# Patient Record
Sex: FEMALE | Race: WHITE | Employment: OTHER | URBAN - METROPOLITAN AREA
[De-identification: names, ages, dates, MRNs, and addresses within clinical notes are randomized per-mention and may not be internally consistent; named-entity substitution may affect disease eponyms.]

---

## 2017-01-30 RX ORDER — ACYCLOVIR 200 MG/1
CAPSULE ORAL
Qty: 50 CAPSULE | Refills: 0 | Status: SHIPPED | OUTPATIENT
Start: 2017-01-30 | End: 2017-08-18 | Stop reason: SDUPTHER

## 2017-02-14 RX ORDER — FLUOXETINE 20 MG/1
TABLET, FILM COATED ORAL
Qty: 90 TABLET | Refills: 3 | Status: SHIPPED | OUTPATIENT
Start: 2017-02-14 | End: 2018-02-09 | Stop reason: SDUPTHER

## 2017-08-18 RX ORDER — ACYCLOVIR 200 MG/1
CAPSULE ORAL
Qty: 50 CAPSULE | Refills: 2 | Status: SHIPPED | OUTPATIENT
Start: 2017-08-18

## 2018-02-09 RX ORDER — FLUOXETINE 20 MG/1
TABLET, FILM COATED ORAL
Qty: 90 TABLET | Refills: 3 | Status: SHIPPED | OUTPATIENT
Start: 2018-02-09 | End: 2022-01-27 | Stop reason: ALTCHOICE

## 2018-02-09 NOTE — TELEPHONE ENCOUNTER
Next Visit Date:  No future appointments.     Health Maintenance   Topic Date Due    Hepatitis C screen  1955    HIV screen  10/29/1968    Pneumococcal med risk (1 of 1 - PPSV23) 10/29/1972    Cervical cancer screen  10/29/1974    Lipid screen  10/29/1993    Colon cancer screen colonoscopy  10/29/2003    Zostavax vaccine  10/29/2013    Flu vaccine (1) 09/01/2017    Breast cancer screen  07/27/2018    DTaP/Tdap/Td vaccine (2 - Td) 08/29/2023       No results found for: LABA1C          ( goal A1C is < 7)   No results found for: LABMICR  No results found for: LDLCHOLESTEROL, LDLCALC    (goal LDL is <100)   No results found for: AST, ALT, BUN  BP Readings from Last 3 Encounters:   11/25/16 (!) 148/93   05/05/15 124/86   03/23/15 136/88          (goal 120/80)    All Future Testing planned in [facility]              Patient Active Problem List:     Anxiety     Depression     COPD (chronic obstructive pulmonary disease) ([facility] [address]

## 2020-02-20 ENCOUNTER — OFFICE VISIT (OUTPATIENT)
Dept: NEUROLOGY | Age: 65
End: 2020-02-20
Payer: MEDICARE

## 2020-02-20 ENCOUNTER — HOSPITAL ENCOUNTER (OUTPATIENT)
Age: 65
Discharge: HOME | End: 2020-02-20
Payer: MEDICARE

## 2020-02-20 VITALS
HEART RATE: 108 BPM | DIASTOLIC BLOOD PRESSURE: 89 MMHG | BODY MASS INDEX: 17.7 KG/M2 | WEIGHT: 113 LBS | SYSTOLIC BLOOD PRESSURE: 137 MMHG

## 2020-02-20 LAB
CERULOPLASMIN: 36 MG/DL (ref 16–45)
ESTIMATED AVERAGE GLUCOSE: 94 MG/DL
FOLATE: 4.6 NG/ML
HBA1C MFR BLD: 4.9 % (ref 4–6)
TSH SERPL DL<=0.05 MIU/L-ACNC: 4.08 MIU/L (ref 0.3–5)
VITAMIN B-12: 998 PG/ML (ref 232–1245)

## 2020-02-20 PROCEDURE — 82525 ASSAY OF COPPER: CPT

## 2020-02-20 PROCEDURE — 82390 ASSAY OF CERULOPLASMIN: CPT

## 2020-02-20 PROCEDURE — 84165 PROTEIN E-PHORESIS SERUM: CPT

## 2020-02-20 PROCEDURE — 3017F COLORECTAL CA SCREEN DOC REV: CPT | Performed by: STUDENT IN AN ORGANIZED HEALTH CARE EDUCATION/TRAINING PROGRAM

## 2020-02-20 PROCEDURE — 4040F PNEUMOC VAC/ADMIN/RCVD: CPT | Performed by: STUDENT IN AN ORGANIZED HEALTH CARE EDUCATION/TRAINING PROGRAM

## 2020-02-20 PROCEDURE — 84443 ASSAY THYROID STIM HORMONE: CPT

## 2020-02-20 PROCEDURE — 83036 HEMOGLOBIN GLYCOSYLATED A1C: CPT

## 2020-02-20 PROCEDURE — 84155 ASSAY OF PROTEIN SERUM: CPT

## 2020-02-20 PROCEDURE — 82607 VITAMIN B-12: CPT

## 2020-02-20 PROCEDURE — 4004F PT TOBACCO SCREEN RCVD TLK: CPT | Performed by: STUDENT IN AN ORGANIZED HEALTH CARE EDUCATION/TRAINING PROGRAM

## 2020-02-20 PROCEDURE — 82746 ASSAY OF FOLIC ACID SERUM: CPT

## 2020-02-20 PROCEDURE — G8427 DOCREV CUR MEDS BY ELIG CLIN: HCPCS | Performed by: STUDENT IN AN ORGANIZED HEALTH CARE EDUCATION/TRAINING PROGRAM

## 2020-02-20 PROCEDURE — 84156 ASSAY OF PROTEIN URINE: CPT

## 2020-02-20 PROCEDURE — 1123F ACP DISCUSS/DSCN MKR DOCD: CPT | Performed by: STUDENT IN AN ORGANIZED HEALTH CARE EDUCATION/TRAINING PROGRAM

## 2020-02-20 PROCEDURE — 1090F PRES/ABSN URINE INCON ASSESS: CPT | Performed by: STUDENT IN AN ORGANIZED HEALTH CARE EDUCATION/TRAINING PROGRAM

## 2020-02-20 PROCEDURE — 99205 OFFICE O/P NEW HI 60 MIN: CPT | Performed by: STUDENT IN AN ORGANIZED HEALTH CARE EDUCATION/TRAINING PROGRAM

## 2020-02-20 PROCEDURE — 84166 PROTEIN E-PHORESIS/URINE/CSF: CPT

## 2020-02-20 PROCEDURE — G8400 PT W/DXA NO RESULTS DOC: HCPCS | Performed by: STUDENT IN AN ORGANIZED HEALTH CARE EDUCATION/TRAINING PROGRAM

## 2020-02-20 PROCEDURE — G8419 CALC BMI OUT NRM PARAM NOF/U: HCPCS | Performed by: STUDENT IN AN ORGANIZED HEALTH CARE EDUCATION/TRAINING PROGRAM

## 2020-02-20 PROCEDURE — 36415 COLL VENOUS BLD VENIPUNCTURE: CPT

## 2020-02-20 PROCEDURE — G8484 FLU IMMUNIZE NO ADMIN: HCPCS | Performed by: STUDENT IN AN ORGANIZED HEALTH CARE EDUCATION/TRAINING PROGRAM

## 2020-02-20 RX ORDER — GABAPENTIN 100 MG/1
CAPSULE ORAL
Qty: 45 CAPSULE | Refills: 0 | Status: SHIPPED | OUTPATIENT
Start: 2020-02-20 | End: 2020-02-21 | Stop reason: SDUPTHER

## 2020-02-20 NOTE — PROGRESS NOTES
[address] [place] [address], [facility], [facility] [place] [facility], [facility], [address] [place] [address]  P: [phone]  F: [phone]    NEUROLOGY CLINIC NOTE     PATIENT NAME: Negra Miguel  PATIENT MRN: [id number]  PRIMARY CARE PHYSICIAN: No primary care provider on file. HPI:      Negra Miguel is a 75 y.o. left handed  female with PMH significant for anxiety and depression was seen in the clinic for neuropathy    History obtained from Patient    Around 4 years ago patient fell down and hurt her ankle, she was found to have left malleolar fracture, initially was treated with the cast but was later given boot for that. But after 1 month she started having tingling and numbness in the lateral aspect of the left foot, gradually getting worse then after couple of months she fell down and got hurt on the right leg. After couple of months started having tingling and numbness on the right feet too. Initially she was having intermittent paresthesias in her feet, gradually getting worse, now she is having constant paresthesias in her feet along with burning pain at the top of the feet, sometimes endorses weakness in the legs especially when walking for a long period of time, denies any difficulty with walking. Denies any back pain or radicular symptoms. Denies any neck pain or radicular symptoms. Denies any symptoms in bilateral upper extremities. Patient also complains of bilateral lower extremity numbness especially on waking up in the morning, she feels as if her legs asleep, it improves after 10 minutes. Denies any saddle anesthesia or perineal numbness or problem with bowel bladder function. Denies taking any medications for the paresthesias. PATIENT HISTORY:     Past Medical History:   Diagnosis Date    Anxiety     Depression         History reviewed. No pertinent surgical history.      Social History     Socioeconomic History    Marital status: Single     Spouse name: Not on file    Number of children: Not on file    Years of education: Not on file    Highest education level: Not on file   Occupational History    Not on file   Social Needs    Financial resource strain: Not on file    Food insecurity:     Worry: Not on file     Inability: Not on file    Transportation needs:     Medical: Not on file     Non-medical: Not on file   Tobacco Use    Smoking status: Current Every Day Smoker     Packs/day: 1.50     Years: 30.00     Pack years: 45.00     Types: Cigarettes     Last attempt to quit: 2014     Years since quittin.1    Smokeless tobacco: Never Used   Substance and Sexual Activity    Alcohol use: No    Drug use: No    Sexual activity: Never   Lifestyle    Physical activity:     Days per week: Not on file     Minutes per session: Not on file    Stress: Not on file   Relationships    Social connections:     Talks on phone: Not on file     Gets together: Not on file     Attends Restoration service: Not on file     Active member of club or organization: Not on file     Attends meetings of clubs or organizations: Not on file     Relationship status: Not on file    Intimate partner violence:     Fear of current or ex partner: Not on file     Emotionally abused: Not on file     Physically abused: Not on file     Forced sexual activity: Not on file   Other Topics Concern    Not on file   Social History Narrative    Not on file        Current Outpatient Medications   Medication Sig Dispense Refill    gabapentin (NEURONTIN) 100 MG capsule Gabapentin 100 mg cap  Week 1 take 100 mg 1 cap at bedtime  Week 2 take 200 mg 2 cap at bedtime and   Week 3 take 300 mg 3 cap at bedtime.  45 capsule 0    FLUoxetine (PROZAC) 20 MG tablet TAKE 1 TABLET DAILY (Patient not taking: Reported on 2020) 90 tablet 3    acyclovir (ZOVIRAX) 200 MG capsule TAKE 2 CAPSULES BY MOUTH FIVE TIMES DAILY (Patient not taking: Reported on 2020) 50 capsule 2    HYDROcodone-acetaminophen (Loraine Lark) 5-325 MG per tablet Take 1 tablet by mouth every 6 hours as needed for Pain (Patient not taking: Reported on 2/20/2020) 20 tablet 0    ibuprofen (ADVIL;MOTRIN) 800 MG tablet Take 1 tablet by mouth every 8 hours as needed for Pain (Patient not taking: Reported on 2/20/2020) 20 tablet 0     No current facility-administered medications for this visit. No Known Allergies     REVIEW OF SYSTEMS:     Review of Systems   Constitutional: Negative for chills, diaphoresis, fever and unexpected weight change. HENT: Negative for congestion, ear discharge, ear pain, hearing loss, sinus pain and sore throat. Eyes: Negative for photophobia, pain, discharge, redness and visual disturbance. Respiratory: Negative for cough and shortness of breath. Cardiovascular: Negative for chest pain, palpitations and leg swelling. Gastrointestinal: Negative for abdominal pain, constipation, diarrhea, nausea and vomiting. Endocrine: Negative for polydipsia and polyuria. Genitourinary: Negative for difficulty urinating and hematuria. Musculoskeletal: Negative for neck pain. Skin: Negative for pallor and rash. Neurological: Positive for weakness and numbness. Negative for dizziness, tremors, seizures, syncope, facial asymmetry, speech difficulty, light-headedness and headaches. Hematological: Does not bruise/bleed easily. Psychiatric/Behavioral: Negative for agitation, behavioral problems and hallucinations. VITALS  /89 (Site: Right Upper Arm, Position: Sitting, Cuff Size: Child)   Pulse 108   Wt 113 lb (51.3 kg)   BMI 17.70 kg/m²      PHYSICAL EXAMINATION:     Constitutional: Well developed, well nourished and in no acute distress. Head:  normocephalic, atraumatic. Neck: supple, no carotid bruits, thyroid not palpable  Respiratory: Clear to auscultation bilaterally with no use of accessory muscles during respiration. Cardiovascular: normal rate, regular rhythm, no murmur, gallop, rub.   Abdomen: Soft, nontender, nondistended, normal bowel sounds, no hepatomegaly or splenomegaly  Extremities:  peripheral pulses palpable, no pedal edema or calf pain with palpation  Psych: normal affect      NEUROLOGICAL EXAMINATION:     Mental status   Alert and oriented; intact memory with no confusion, speech or language problems; no hallucinations or delusions     Cranial nerves   II - visual fields intact to confrontation                                                III, IV, VI - extra-ocular muscles full: no pupillary defect; no ROSINA, no nystagmus, no ptosis   V - normal facial sensation                                                               VII - normal facial symmetry                                                             VIII - intact hearing                                                                             IX, X - symmetrical palate                                                                  XI - symmetrical shoulder shrug                                                       XII - midline tongue without atrophy or fasciculation     Motor function  Normal muscle bulk and tone  Muscle strength: normal power 5/5 except for LLE 4+/5 and RLE 5-/5       Sensory function Intact to touch, pin prick, vibration, proprioception in bilateral upper  extremities. Decreased light touch and pin prick in bilateral lower extremities from distal to proximal distribution. Cerebellar  impaired fine hand movements. Finger-to-nose intact bilaterally  Intermittent postural tremors noted on exam bilaterally, worse on the left side as compared to the right side, slightly improves with some distraction. Reflex function  brisk reflexes bilaterally. Negative Babinski  hoffamans positive on left side. 2-3 beats of nonsustained clonus noted on both sides     Gait                  Normal station and gait  Difficulty with performing toe and heel walking.             PRIOR TESTS AND IMAGING: Following images and Labs were reviewed by the examiner     none      ASSESSMENT / PLAN:     Keely Chavarria is a 75 y.o. left handed  female with PMH significant for anxiety and depression was seen in the clinic for neuropathy    Possible peripheral polyneuropathy. Pain and paresthesias in bilateral feet. History of anxiety and depression   COPD      PLAN:   -Would like to evaluate with reversible causes of neuropathy including HbA1c, vitamin B12, folate, TSH, serum copper and serum plasma level, SPEP, UPEP  -EMG nerve conduction studies of bilateral lower extremities  -Will give a trial of Gabapentin 300 mg at bedtime. Week 1 100 mg QHS  Week 2 200 mg QHS  Week 3 300 mg QHS. Discussed possible side effects of sedation, drowsiness and dizziness, ataxia with the patient. Instructed patient to call the clinic if develop any of the possible side effects.  - Follow up in the clinic after above testing  - Instructed patient to call the clinic if symptoms worsen or develop any new symptoms. I have spent 60 minutes face to face with the patient more than 50% of this time was spent counseling and coordinating care.       Electronically signed by Keisha Kapadia MD on 2/20/2020 at 4:48 PM

## 2020-02-21 LAB
ALBUMIN (CALCULATED): 2.9 G/DL (ref 3.2–5.2)
ALBUMIN PERCENT: 47 % (ref 45–65)
ALPHA 1 PERCENT: 6 % (ref 3–6)
ALPHA 2 PERCENT: 14 % (ref 6–13)
ALPHA-1-GLOBULIN: 0.4 G/DL (ref 0.1–0.4)
ALPHA-2-GLOBULIN: 0.9 G/DL (ref 0.5–0.9)
BETA GLOBULIN: 1 G/DL (ref 0.5–1.1)
BETA PERCENT: 16 % (ref 11–19)
GAMMA GLOBULIN %: 17 % (ref 9–20)
GAMMA GLOBULIN: 1 G/DL (ref 0.5–1.5)
PATHOLOGIST: ABNORMAL
PROTEIN ELECTROPHORESIS, SERUM: ABNORMAL
TOTAL PROT. SUM,%: 100 % (ref 98–102)
TOTAL PROT. SUM: 6.2 G/DL (ref 6.3–8.2)
TOTAL PROTEIN: 6.2 G/DL (ref 6.4–8.3)

## 2020-02-21 RX ORDER — GABAPENTIN 100 MG/1
CAPSULE ORAL
Qty: 45 CAPSULE | Refills: 0 | Status: SHIPPED | OUTPATIENT
Start: 2020-02-21 | End: 2020-03-25 | Stop reason: SDUPTHER

## 2020-02-21 ASSESSMENT — ENCOUNTER SYMPTOMS
SINUS PAIN: 0
SORE THROAT: 0
DIARRHEA: 0
SHORTNESS OF BREATH: 0
CONSTIPATION: 0
COUGH: 0
EYE DISCHARGE: 0
PHOTOPHOBIA: 0
ABDOMINAL PAIN: 0
NAUSEA: 0
EYE PAIN: 0
EYE REDNESS: 0
VOMITING: 0

## 2020-02-22 LAB — COPPER: 166.2 UG/DL (ref 80–155)

## 2020-02-24 LAB
P E INTERPRETATION, U: NORMAL
PATHOLOGIST: NORMAL
SPECIMEN TYPE: NORMAL
URINE TOTAL PROTEIN: 62 MG/DL

## 2020-03-25 RX ORDER — GABAPENTIN 100 MG/1
300 CAPSULE ORAL NIGHTLY
Qty: 270 CAPSULE | Refills: 1 | Status: SHIPPED | OUTPATIENT
Start: 2020-03-25 | End: 2021-02-09

## 2020-03-25 RX ORDER — GABAPENTIN 100 MG/1
CAPSULE ORAL
Qty: 45 CAPSULE | Refills: 0 | OUTPATIENT
Start: 2020-03-25 | End: 2021-03-26

## 2020-06-04 ENCOUNTER — OFFICE VISIT (OUTPATIENT)
Dept: NEUROLOGY | Age: 65
End: 2020-06-04
Payer: MEDICARE

## 2020-06-04 VITALS
OXYGEN SATURATION: 98 % | HEIGHT: 67 IN | HEART RATE: 83 BPM | SYSTOLIC BLOOD PRESSURE: 128 MMHG | DIASTOLIC BLOOD PRESSURE: 87 MMHG | BODY MASS INDEX: 18.05 KG/M2 | WEIGHT: 115 LBS

## 2020-06-04 PROCEDURE — 99214 OFFICE O/P EST MOD 30 MIN: CPT | Performed by: STUDENT IN AN ORGANIZED HEALTH CARE EDUCATION/TRAINING PROGRAM

## 2020-06-04 ASSESSMENT — ENCOUNTER SYMPTOMS
ABDOMINAL PAIN: 0
EYE PAIN: 0
NAUSEA: 0
CONSTIPATION: 0
COUGH: 0
EYE REDNESS: 0
EYE DISCHARGE: 0
SORE THROAT: 0
SINUS PAIN: 0
SHORTNESS OF BREATH: 0
DIARRHEA: 0
PHOTOPHOBIA: 0
VOMITING: 0

## 2020-06-04 NOTE — PROGRESS NOTES
[address] [place] [address], [facility] [place] [address], [facility], [facility], [address] [place] [address]  P: [phone]  F: [phone]    NEUROLOGY CLINIC NOTE     PATIENT NAME: Quan Brown  PATIENT MRN: [id number]  PRIMARY CARE PHYSICIAN: No primary care provider on file. Interval history 6/4/2020  Patient was last seen in the clinic in February 2020, during the last visit she was started on gabapentin for neuropathy, she was prescribed 300 mg at nighttime with gradual dose titration. Currently she is taking only 200 mg in the morning, as per patient she did not feel that she needed 300 mg. Endorses improvement in her paresthesias with gabapentin though still having intermittent episodes of pain in paresthesias in her feet. EMG nerve conduction study was ordered during last visit, she does not want to do the procedure as it is painful. Lab work-up from last visit showed slightly elevated copper level, denies taking any copper supplements. Ceruloplasmin level was normal.  Discussed SPEP and UPEP results with the patient. Patient denies any other concerns during this visit. Notes from 2/20/2020  HPI:      Quan Brown is a 75 y.o. left handed  female with PMH significant for anxiety and depression was seen in the clinic for neuropathy    History obtained from Patient    Around 4 years ago patient fell down and hurt her ankle, she was found to have left malleolar fracture, initially was treated with the cast but was later given boot for that. But after 1 month she started having tingling and numbness in the lateral aspect of the left foot, gradually getting worse then after couple of months she fell down and got hurt on the right leg. After couple of months started having tingling and numbness on the right feet too.    Initially she was having intermittent paresthesias in her feet, gradually getting worse, now she is having constant paresthesias in her feet along with burning pain at the top of the feet, sometimes endorses weakness in the legs especially when walking for a long period of time, denies any difficulty with walking. Denies any back pain or radicular symptoms. Denies any neck pain or radicular symptoms. Denies any symptoms in bilateral upper extremities. Patient also complains of bilateral lower extremity numbness especially on waking up in the morning, she feels as if her legs asleep, it improves after 10 minutes. Denies any saddle anesthesia or perineal numbness or problem with bowel bladder function. Denies taking any medications for the paresthesias. PATIENT HISTORY:     Past Medical History:   Diagnosis Date    Anxiety     Depression         No past surgical history on file.      Social History     Socioeconomic History    Marital status: Single     Spouse name: Not on file    Number of children: Not on file    Years of education: Not on file    Highest education level: Not on file   Occupational History    Not on file   Social Needs    Financial resource strain: Not on file    Food insecurity     Worry: Not on file     Inability: Not on file    Transportation needs     Medical: Not on file     Non-medical: Not on file   Tobacco Use    Smoking status: Current Every Day Smoker     Packs/day: 1.50     Years: 30.00     Pack years: 45.00     Types: Cigarettes     Last attempt to quit: 2014     Years since quittin.4    Smokeless tobacco: Never Used   Substance and Sexual Activity    Alcohol use: No    Drug use: No    Sexual activity: Never   Lifestyle    Physical activity     Days per week: Not on file     Minutes per session: Not on file    Stress: Not on file   Relationships    Social connections     Talks on phone: Not on file     Gets together: Not on file     Attends Orthodoxy service: Not on file     Active member of club or organization: Not on file     Attends meetings of clubs or organizations: Not on file     Relationship status: Not on file   Hans Salcido Intimate partner violence     Fear of current or ex partner: Not on file     Emotionally abused: Not on file     Physically abused: Not on file     Forced sexual activity: Not on file   Other Topics Concern    Not on file   Social History Narrative    Not on file        Current Outpatient Medications   Medication Sig Dispense Refill    gabapentin (NEURONTIN) 100 MG capsule Take 3 capsules by mouth nightly for 90 days. Gabapentin 100 mg cap  Week 1 take 100 mg 1 cap at bedtime  Week 2 take 200 mg 2 cap at bedtime and   Week 3 take 300 mg 3 cap at bedtime. 270 capsule 1    FLUoxetine (PROZAC) 20 MG tablet TAKE 1 TABLET DAILY (Patient not taking: Reported on 2/20/2020) 90 tablet 3    acyclovir (ZOVIRAX) 200 MG capsule TAKE 2 CAPSULES BY MOUTH FIVE TIMES DAILY (Patient not taking: Reported on 2/20/2020) 50 capsule 2    HYDROcodone-acetaminophen (NORCO) 5-325 MG per tablet Take 1 tablet by mouth every 6 hours as needed for Pain (Patient not taking: Reported on 2/20/2020) 20 tablet 0    ibuprofen (ADVIL;MOTRIN) 800 MG tablet Take 1 tablet by mouth every 8 hours as needed for Pain (Patient not taking: Reported on 2/20/2020) 20 tablet 0     No current facility-administered medications for this visit. No Known Allergies     REVIEW OF SYSTEMS:     Review of Systems   Constitutional: Negative for chills, diaphoresis, fever and unexpected weight change. HENT: Negative for congestion, ear discharge, ear pain, hearing loss, sinus pain and sore throat. Eyes: Negative for photophobia, pain, discharge, redness and visual disturbance. Respiratory: Negative for cough and shortness of breath. Cardiovascular: Negative for chest pain, palpitations and leg swelling. Gastrointestinal: Negative for abdominal pain, constipation, diarrhea, nausea and vomiting. Endocrine: Negative for polydipsia and polyuria. Genitourinary: Negative for difficulty urinating and hematuria.    Musculoskeletal: Negative for neck pain.   Skin: Negative for pallor and rash. Neurological: Positive for weakness and numbness. Negative for dizziness, tremors, seizures, syncope, facial asymmetry, speech difficulty, light-headedness and headaches. Hematological: Does not bruise/bleed easily. Psychiatric/Behavioral: Negative for agitation, behavioral problems and hallucinations. VITALS  /87 (Site: Left Upper Arm, Position: Sitting, Cuff Size: Medium Adult)   Pulse 83   Ht 5' 7\" (1.702 m)   Wt 115 lb (52.2 kg)   SpO2 98%   BMI 18.01 kg/m²      PHYSICAL EXAMINATION:     Constitutional: Well developed, well nourished and in no acute distress. Head:  normocephalic, atraumatic. Neck: supple, no carotid bruits, thyroid not palpable  Respiratory: Clear to auscultation bilaterally with no use of accessory muscles during respiration. Cardiovascular: normal rate, regular rhythm, no murmur, gallop, rub.   Abdomen: Soft, nontender, nondistended, normal bowel sounds, no hepatomegaly or splenomegaly  Extremities:  peripheral pulses palpable, no pedal edema or calf pain with palpation  Psych: normal affect      NEUROLOGICAL EXAMINATION:     Mental status   Alert and oriented; intact memory with no confusion, speech or language problems; no hallucinations or delusions     Cranial nerves   II - visual fields intact to confrontation                                                III, IV, VI - extra-ocular muscles full: no pupillary defect; no ROSINA, no nystagmus, no ptosis   V - normal facial sensation                                                               VII - normal facial symmetry                                                             VIII - intact hearing                                                                             IX, X - symmetrical palate                                                                  XI - symmetrical shoulder shrug                                                       XII - midline tongue without atrophy or fasciculation     Motor function  Normal muscle bulk and tone  Muscle strength: normal power 5/5 except for LLE 4+/5 and RLE 5-/5       Sensory function Intact to touch, pin prick, vibration, proprioception in bilateral upper  extremities. Decreased light touch and pin prick in bilateral lower extremities from distal to proximal distribution. Cerebellar  impaired fine hand movements. Finger-to-nose intact bilaterally  Intermittent postural tremors noted on exam bilaterally, worse on the left side as compared to the right side, slightly improves with some distraction. Reflex function  brisk reflexes bilaterally. Negative Babinski  hoffamans positive on left side. 2-3 beats of nonsustained clonus noted on both sides     Gait                  Normal station and gait  Difficulty with performing toe and heel walking. PRIOR TESTS AND IMAGING: Following images and Labs were reviewed by the examiner     none    2/20/2020  Vitamin B12 998  Folate 4.6  TSH 4.08  SPEP no M band seen  UPEP-elevated protein concentration  HbA1c 4.9  Serum copper 166.2  Ceruloplasmin 36      ASSESSMENT / PLAN:     Gabrielle Samson is a 75 y.o. left handed  female with PMH significant for anxiety and depression was seen in the clinic for neuropathy    · Possible peripheral polyneuropathy. Pain and paresthesias in bilateral feet. · History of anxiety and depression   · COPD    2/20/2020  Vitamin B12 998  Folate 4.6  TSH 4.08  SPEP no M band seen  UPEP-elevated protein concentration  HbA1c 4.9  Serum copper 166.2  Ceruloplasmin 36    PLAN:     -EMG nerve conduction studies of bilateral lower extremities was ordered during last visit, patient does not want to do the testing due to pain. -Instructed patient to take gabapentin 300 mg daily, at present she is taking 200 mg daily.   She will call back in 2 weeks to see if 300 mg is helping her, or she wants to increase it to 300 mg twice a day.  Discussed possible side effects of sedation, drowsiness and dizziness, ataxia with the patient. Instructed patient to call the clinic if develop any of the possible side effects.    - Follow up in the clinic in 4 to 5 months  - Instructed patient to call the clinic if symptoms worsen or develop any new symptoms. I have spent 25 minutes face to face with the patient more than 50% of this time was spent counseling and coordinating care.       Electronically signed by Javier Lynn MD on 6/4/2020 at 11:35 AM

## 2020-06-17 ENCOUNTER — TELEPHONE (OUTPATIENT)
Dept: NEUROLOGY | Age: 65
End: 2020-06-17

## 2020-06-17 NOTE — TELEPHONE ENCOUNTER
Patient states that the Gabapentin 100 mg capsules take 3 capsules at night is not working. Patient states that she takes 2 capsules in the morning and 1 capsule at night because when she takes all 3 at night it makes her sweaty like she has jumped in a pool. She states that the medication is not working for her. She states that she would like your Advice on her taking 400 MG a day 2 capsules in the morning and 2 capsules at night she states that the neuropathy is tolerable in her feet and legs.

## 2020-10-05 ENCOUNTER — HOSPITAL ENCOUNTER (OUTPATIENT)
Age: 65
Discharge: HOME | End: 2020-10-05
Payer: MEDICARE

## 2020-10-05 PROCEDURE — 36415 COLL VENOUS BLD VENIPUNCTURE: CPT

## 2020-10-05 PROCEDURE — 82525 ASSAY OF COPPER: CPT

## 2020-10-06 ENCOUNTER — OFFICE VISIT (OUTPATIENT)
Dept: NEUROLOGY | Age: 65
End: 2020-10-06
Payer: MEDICARE

## 2020-10-06 VITALS
DIASTOLIC BLOOD PRESSURE: 72 MMHG | BODY MASS INDEX: 18.16 KG/M2 | WEIGHT: 113 LBS | TEMPERATURE: 96 F | OXYGEN SATURATION: 99 % | SYSTOLIC BLOOD PRESSURE: 112 MMHG | HEART RATE: 95 BPM | HEIGHT: 66 IN

## 2020-10-06 PROCEDURE — 99214 OFFICE O/P EST MOD 30 MIN: CPT | Performed by: STUDENT IN AN ORGANIZED HEALTH CARE EDUCATION/TRAINING PROGRAM

## 2020-10-06 RX ORDER — GABAPENTIN 100 MG/1
200 CAPSULE ORAL 2 TIMES DAILY
Qty: 120 CAPSULE | Refills: 3 | Status: SHIPPED | OUTPATIENT
Start: 2020-10-06 | End: 2021-02-09 | Stop reason: SDUPTHER

## 2020-10-06 RX ORDER — GABAPENTIN 100 MG/1
300 CAPSULE ORAL NIGHTLY
Qty: 270 CAPSULE | Refills: 1 | Status: CANCELLED | OUTPATIENT
Start: 2020-10-06 | End: 2021-01-04

## 2020-10-06 ASSESSMENT — ENCOUNTER SYMPTOMS
VOMITING: 0
EYE DISCHARGE: 0
SORE THROAT: 0
PHOTOPHOBIA: 0
ABDOMINAL PAIN: 0
COUGH: 0
SINUS PAIN: 0
EYE PAIN: 0
NAUSEA: 0
SHORTNESS OF BREATH: 0
DIARRHEA: 0
CONSTIPATION: 0
EYE REDNESS: 0

## 2020-10-06 NOTE — PROGRESS NOTES
[address] [place] [address], [facility], [facility], [facility], [address] [place] [address]  P: [phone]  F: [phone]    NEUROLOGY CLINIC NOTE     PATIENT NAME: Elvin Magaña  PATIENT MRN: [id number]  PRIMARY CARE PHYSICIAN: No primary care provider on file. Interval History: 10/6/2020  Patient was last seen in the clinic in June 2020. Since last visit overall she endorses significant improvement in her paresthesias in her hands and feet. As per patient if she missed her doses endorses worsening of the symptoms with that. Currently she is taking gabapentin 200 mg in the morning and 200 mg at nighttime. Denies any side effects of the medications. Patient endorses feeling sweaty with 300 mg gabapentin at nighttime. Otherwise denies any other new neurologic concerns, denies any changes in her other medications. Denies any other new allergies. She still does not want to do EMG nerve conduction study. Interval history 6/4/2020  Patient was last seen in the clinic in February 2020, during the last visit she was started on gabapentin for neuropathy, she was prescribed 300 mg at nighttime with gradual dose titration. Currently she is taking only 200 mg in the morning, as per patient she did not feel that she needed 300 mg. Endorses improvement in her paresthesias with gabapentin though still having intermittent episodes of pain in paresthesias in her feet. EMG nerve conduction study was ordered during last visit, she does not want to do the procedure as it is painful. Lab work-up from last visit showed slightly elevated copper level, denies taking any copper supplements. Ceruloplasmin level was normal.  Discussed SPEP and UPEP results with the patient. Patient denies any other concerns during this visit.     Notes from 2/20/2020  HPI:      Elvin Magaña is a 75 y.o. left handed  female with PMH significant for anxiety and depression was seen in the clinic for neuropathy    History obtained from Patient    Around 4 years ago patient fell down and hurt her ankle, she was found to have left malleolar fracture, initially was treated with the cast but was later given boot for that. But after 1 month she started having tingling and numbness in the lateral aspect of the left foot, gradually getting worse then after couple of months she fell down and got hurt on the right leg. After couple of months started having tingling and numbness on the right feet too. Initially she was having intermittent paresthesias in her feet, gradually getting worse, now she is having constant paresthesias in her feet along with burning pain at the top of the feet, sometimes endorses weakness in the legs especially when walking for a long period of time, denies any difficulty with walking. Denies any back pain or radicular symptoms. Denies any neck pain or radicular symptoms. Denies any symptoms in bilateral upper extremities. Patient also complains of bilateral lower extremity numbness especially on waking up in the morning, she feels as if her legs asleep, it improves after 10 minutes. Denies any saddle anesthesia or perineal numbness or problem with bowel bladder function. Denies taking any medications for the paresthesias. PATIENT HISTORY:     Past Medical History:   Diagnosis Date    Anxiety     Depression         No past surgical history on file.      Social History     Socioeconomic History    Marital status: Single     Spouse name: Not on file    Number of children: Not on file    Years of education: Not on file    Highest education level: Not on file   Occupational History    Not on file   Social Needs    Financial resource strain: Not on file    Food insecurity     Worry: Not on file     Inability: Not on file    Transportation needs     Medical: Not on file     Non-medical: Not on file   Tobacco Use    Smoking status: Current Every Day Smoker     Packs/day: 1.50 Years: 30.00     Pack years: 45.00     Types: Cigarettes     Last attempt to quit: 2014     Years since quittin.7    Smokeless tobacco: Never Used   Substance and Sexual Activity    Alcohol use: No    Drug use: No    Sexual activity: Never   Lifestyle    Physical activity     Days per week: Not on file     Minutes per session: Not on file    Stress: Not on file   Relationships    Social connections     Talks on phone: Not on file     Gets together: Not on file     Attends [employer] service: Not on file     Active member of club or organization: Not on file     Attends meetings of clubs or organizations: Not on file     Relationship status: Not on file    Intimate partner violence     Fear of current or ex partner: Not on file     Emotionally abused: Not on file     Physically abused: Not on file     Forced sexual activity: Not on file   Other Topics Concern    Not on file   Social History Narrative    Not on file        Current Outpatient Medications   Medication Sig Dispense Refill    gabapentin (NEURONTIN) 100 MG capsule Take 2 capsules by mouth 2 times daily for 30 days. Intended supply: 30 days 120 capsule 3    gabapentin (NEURONTIN) 100 MG capsule Take 3 capsules by mouth nightly for 90 days. Gabapentin 100 mg cap  Week 1 take 100 mg 1 cap at bedtime  Week 2 take 200 mg 2 cap at bedtime and   Week 3 take 300 mg 3 cap at bedtime.  270 capsule 1    FLUoxetine (PROZAC) 20 MG tablet TAKE 1 TABLET DAILY (Patient not taking: Reported on 2020) 90 tablet 3    acyclovir (ZOVIRAX) 200 MG capsule TAKE 2 CAPSULES BY MOUTH FIVE TIMES DAILY (Patient not taking: Reported on 2020) 50 capsule 2    HYDROcodone-acetaminophen (NORCO) 5-325 MG per tablet Take 1 tablet by mouth every 6 hours as needed for Pain (Patient not taking: Reported on 2020) 20 tablet 0    ibuprofen (ADVIL;MOTRIN) 800 MG tablet Take 1 tablet by mouth every 8 hours as needed for Pain (Patient not taking: Reported on 2/20/2020) 20 tablet 0     No current facility-administered medications for this visit. No Known Allergies     REVIEW OF SYSTEMS:     Review of Systems   Constitutional: Negative for chills, diaphoresis, fever and unexpected weight change. HENT: Negative for congestion, ear discharge, ear pain, hearing loss, sinus pain and sore throat. Eyes: Negative for photophobia, pain, discharge, redness and visual disturbance. Respiratory: Negative for cough and shortness of breath. Cardiovascular: Negative for chest pain, palpitations and leg swelling. Gastrointestinal: Negative for abdominal pain, constipation, diarrhea, nausea and vomiting. Endocrine: Negative for polydipsia and polyuria. Genitourinary: Negative for difficulty urinating and hematuria. Musculoskeletal: Negative for neck pain. Skin: Negative for pallor and rash. Neurological: Positive for weakness and numbness. Negative for dizziness, tremors, seizures, syncope, facial asymmetry, speech difficulty, light-headedness and headaches. Hematological: Does not bruise/bleed easily. Psychiatric/Behavioral: Negative for agitation, behavioral problems and hallucinations. VITALS  /72 (Site: Left Upper Arm, Position: Sitting, Cuff Size: Medium Adult)   Pulse 95   Temp 96 °F (35.6 °C) (Temporal)   Ht 5' 6\" (1.676 m)   Wt 113 lb (51.3 kg)   SpO2 99%   BMI 18.24 kg/m²      PHYSICAL EXAMINATION:     Constitutional: Well developed, well nourished and in no acute distress. Head:  normocephalic, atraumatic. Neck: supple, no carotid bruits, thyroid not palpable  Respiratory: Clear to auscultation bilaterally with no use of accessory muscles during respiration. Cardiovascular: normal rate, regular rhythm, no murmur, gallop, rub.   Abdomen: Soft, nontender, nondistended, normal bowel sounds, no hepatomegaly or splenomegaly  Extremities:  peripheral pulses palpable, no pedal edema or calf pain with palpation  Psych: normal affect      NEUROLOGICAL EXAMINATION:     Mental status   Alert and oriented; intact memory with no confusion, speech or language problems; no hallucinations or delusions     Cranial nerves   II - visual fields intact to confrontation                                                III, IV, VI - extra-ocular muscles full: no pupillary defect; no ROSINA, no nystagmus, no ptosis   V - normal facial sensation                                                               VII - normal facial symmetry                                                             VIII - intact hearing                                                                             IX, X - symmetrical palate                                                                  XI - symmetrical shoulder shrug                                                       XII - midline tongue without atrophy or fasciculation     Motor function  Normal muscle bulk and tone  Muscle strength: normal power 5/5 except for LLE 4+/5 and RLE 5-/5       Sensory function Intact to touch, pin prick, vibration, proprioception in bilateral upper  extremities. Decreased light touch and pin prick in bilateral lower extremities from distal to proximal distribution. Cerebellar  impaired fine hand movements. Finger-to-nose intact bilaterally  Intermittent postural tremors noted on exam bilaterally, worse on the left side as compared to the right side, slightly improves with some distraction. Reflex function  brisk reflexes bilaterally. Negative Babinski  hoffamans positive on left side. 2-3 beats of nonsustained clonus noted on both sides     Gait                  Normal station and gait  Difficulty with performing toe and heel walking.             PRIOR TESTS AND IMAGING: Following images and Labs were reviewed by the examiner     none    2/20/2020  Vitamin B12 998  Folate 4.6  TSH 4.08  SPEP no M band seen  UPEP-elevated protein concentration  HbA1c 4.9  Serum copper

## 2020-10-08 LAB — COPPER: 184.9 UG/DL (ref 80–155)

## 2021-02-09 ENCOUNTER — OFFICE VISIT (OUTPATIENT)
Dept: NEUROLOGY | Age: 66
End: 2021-02-09
Payer: MEDICARE

## 2021-02-09 VITALS
WEIGHT: 113 LBS | OXYGEN SATURATION: 99 % | SYSTOLIC BLOOD PRESSURE: 106 MMHG | DIASTOLIC BLOOD PRESSURE: 72 MMHG | HEART RATE: 87 BPM | BODY MASS INDEX: 18.16 KG/M2 | HEIGHT: 66 IN

## 2021-02-09 DIAGNOSIS — R20.2 PARESTHESIA OF BOTH FEET: Primary | ICD-10-CM

## 2021-02-09 DIAGNOSIS — G62.9 NEUROPATHY: ICD-10-CM

## 2021-02-09 DIAGNOSIS — Z86.59 HISTORY OF DEPRESSION: ICD-10-CM

## 2021-02-09 PROCEDURE — 99214 OFFICE O/P EST MOD 30 MIN: CPT | Performed by: STUDENT IN AN ORGANIZED HEALTH CARE EDUCATION/TRAINING PROGRAM

## 2021-02-09 RX ORDER — GABAPENTIN 100 MG/1
200 CAPSULE ORAL 2 TIMES DAILY
Qty: 120 CAPSULE | Refills: 5 | Status: SHIPPED | OUTPATIENT
Start: 2021-02-09 | End: 2021-06-16 | Stop reason: SDUPTHER

## 2021-02-09 RX ORDER — ESCITALOPRAM OXALATE 10 MG/1
10 TABLET ORAL DAILY
Qty: 30 TABLET | Refills: 5 | Status: SHIPPED | OUTPATIENT
Start: 2021-02-09 | End: 2021-06-16 | Stop reason: SDUPTHER

## 2021-02-09 ASSESSMENT — ENCOUNTER SYMPTOMS
SORE THROAT: 0
SHORTNESS OF BREATH: 0
DIARRHEA: 0
EYE PAIN: 0
EYE DISCHARGE: 0
PHOTOPHOBIA: 0
CONSTIPATION: 0
ABDOMINAL PAIN: 0
NAUSEA: 0
EYE REDNESS: 0
COUGH: 0
VOMITING: 0
SINUS PAIN: 0

## 2021-02-09 NOTE — PROGRESS NOTES
[address] [place] [address], [facility] [address], [facility], [facility], [address] [place] [address]  P: [phone]  F: [phone]    NEUROLOGY CLINIC NOTE     PATIENT NAME: Sarika Fisher  PATIENT MRN: [id number]  PRIMARY CARE PHYSICIAN: No primary care provider on file. Interval History: 2/9/2021  Patient was last seen in the clinic in October 2020. Since last clinic visit endorses overall improvement in the paresthesias in the hands and the feet. Currently she is taking gabapentin 200 mg twice a day, denies any side effects from the medication, was not able to tolerate 300 mg, was sweaty with that. She endorses worsening depression for last couple of months, endorses significant improvement in her depression with Lexapro which she had taken in the past, would like to try that medication again. Denies any other new neurologic concerns during this visit. Denies any other changes in her medications. Denies any new allergies. Interval History: 10/6/2020  Patient was last seen in the clinic in June 2020. Since last visit overall she endorses significant improvement in her paresthesias in her hands and feet. As per patient if she missed her doses endorses worsening of the symptoms with that. Currently she is taking gabapentin 200 mg in the morning and 200 mg at nighttime. Denies any side effects of the medications. Patient endorses feeling sweaty with 300 mg gabapentin at nighttime. Otherwise denies any other new neurologic concerns, denies any changes in her other medications. Denies any other new allergies. She still does not want to do EMG nerve conduction study. Interval history 6/4/2020  Patient was last seen in the clinic in February 2020, during the last visit she was started on gabapentin for neuropathy, she was prescribed 300 mg at nighttime with gradual dose titration.   Currently she is taking only 200 mg in the morning, as per patient she did not feel that she needed 300 mg.  Endorses improvement in her paresthesias with gabapentin though still having intermittent episodes of pain in paresthesias in her feet. EMG nerve conduction study was ordered during last visit, she does not want to do the procedure as it is painful. Lab work-up from last visit showed slightly elevated copper level, denies taking any copper supplements. Ceruloplasmin level was normal.  Discussed SPEP and UPEP results with the patient. Patient denies any other concerns during this visit. Notes from 2/20/2020  HPI:      Nagi James is a 77 y.o. left handed  female with PMH significant for anxiety and depression was seen in the clinic for neuropathy    History obtained from Patient    Around 4 years ago patient fell down and hurt her ankle, she was found to have left malleolar fracture, initially was treated with the cast but was later given boot for that. But after 1 month she started having tingling and numbness in the lateral aspect of the left foot, gradually getting worse then after couple of months she fell down and got hurt on the right leg. After couple of months started having tingling and numbness on the right feet too. Initially she was having intermittent paresthesias in her feet, gradually getting worse, now she is having constant paresthesias in her feet along with burning pain at the top of the feet, sometimes endorses weakness in the legs especially when walking for a long period of time, denies any difficulty with walking. Denies any back pain or radicular symptoms. Denies any neck pain or radicular symptoms. Denies any symptoms in bilateral upper extremities. Patient also complains of bilateral lower extremity numbness especially on waking up in the morning, she feels as if her legs asleep, it improves after 10 minutes. Denies any saddle anesthesia or perineal numbness or problem with bowel bladder function. Denies taking any medications for the paresthesias. PATIENT HISTORY:     Past Medical History:   Diagnosis Date    Anxiety     Depression         No past surgical history on file. Social History     Socioeconomic History    Marital status: Single     Spouse name: Not on file    Number of children: Not on file    Years of education: Not on file    Highest education level: Not on file   Occupational History    Not on file   Social Needs    Financial resource strain: Not on file    Food insecurity     Worry: Not on file     Inability: Not on file    Transportation needs     Medical: Not on file     Non-medical: Not on file   Tobacco Use    Smoking status: Current Every Day Smoker     Packs/day: 1.50     Years: 30.00     Pack years: 45.00     Types: Cigarettes     Last attempt to quit: 2014     Years since quittin.1    Smokeless tobacco: Never Used   Substance and Sexual Activity    Alcohol use: No    Drug use: No    Sexual activity: Never   Lifestyle    Physical activity     Days per week: Not on file     Minutes per session: Not on file    Stress: Not on file   Relationships    Social connections     Talks on phone: Not on file     Gets together: Not on file     Attends [employer] service: Not on file     Active member of club or organization: Not on file     Attends meetings of clubs or organizations: Not on file     Relationship status: Not on file    Intimate partner violence     Fear of current or ex partner: Not on file     Emotionally abused: Not on file     Physically abused: Not on file     Forced sexual activity: Not on file   Other Topics Concern    Not on file   Social History Narrative    Not on file        Current Outpatient Medications   Medication Sig Dispense Refill    escitalopram (LEXAPRO) 10 MG tablet Take 1 tablet by mouth daily 30 tablet 5    gabapentin (NEURONTIN) 100 MG capsule Take 2 capsules by mouth 2 times daily for 30 days.  Intended supply: 30 days 120 capsule 5    FLUoxetine (PROZAC) 20 MG tablet TAKE 1 TABLET DAILY (Patient not taking: Reported on 2/20/2020) 90 tablet 3    acyclovir (ZOVIRAX) 200 MG capsule TAKE 2 CAPSULES BY MOUTH FIVE TIMES DAILY (Patient not taking: Reported on 2/20/2020) 50 capsule 2    HYDROcodone-acetaminophen (NORCO) 5-325 MG per tablet Take 1 tablet by mouth every 6 hours as needed for Pain (Patient not taking: Reported on 2/20/2020) 20 tablet 0     No current facility-administered medications for this visit. No Known Allergies     REVIEW OF SYSTEMS:     Review of Systems   Constitutional: Negative for chills, diaphoresis, fever and unexpected weight change. HENT: Negative for congestion, ear discharge, ear pain, hearing loss, sinus pain and sore throat. Eyes: Negative for photophobia, pain, discharge, redness and visual disturbance. Respiratory: Negative for cough and shortness of breath. Cardiovascular: Negative for chest pain, palpitations and leg swelling. Gastrointestinal: Negative for abdominal pain, constipation, diarrhea, nausea and vomiting. Endocrine: Negative for polydipsia and polyuria. Genitourinary: Negative for difficulty urinating and hematuria. Musculoskeletal: Negative for neck pain. Skin: Negative for pallor and rash. Neurological: Positive for weakness and numbness. Negative for dizziness, tremors, seizures, syncope, facial asymmetry, speech difficulty, light-headedness and headaches. Hematological: Does not bruise/bleed easily. Psychiatric/Behavioral: Positive for decreased concentration and dysphoric mood. Negative for agitation, behavioral problems, hallucinations and sleep disturbance. VITALS  /72   Pulse 87   Ht 5' 6\" (1.676 m)   Wt 113 lb (51.3 kg)   SpO2 99%   BMI 18.24 kg/m²      PHYSICAL EXAMINATION:     Constitutional: Well developed and in no acute distress. Head:  normocephalic. Neck: supple, no carotid bruits  Respiratory: Clear to auscultation bilaterally.    Cardiovascular: normal rate, regular 4. 6  TSH 4.08  SPEP no M band seen  UPEP-elevated protein concentration  HbA1c 4.9  Serum copper 166.2  Ceruloplasmin 36      ASSESSMENT / PLAN:     Pepe Porras is a 75 y.o. left handed  female with PMH significant for anxiety and depression was seen in the clinic for neuropathy    · Possible peripheral polyneuropathy. Pain and paresthesias in bilateral feet. · Worsening depression  · History of anxiety and depression   · COPD    2/20/2020  Vitamin B12 998  Folate 4.6  TSH 4.08  SPEP no M band seen  UPEP-elevated protein concentration  HbA1c 4.9  Serum copper 166.2  Ceruloplasmin 36    PLAN:     -Patient does not want to do EMG at present as her symptoms are better with gabapentin.    -Continue gabapentin 200 mg twice a day. Discussed possible side effects of sedation, drowsiness and dizziness, ataxia with the patient. Instructed patient to call the clinic if develop any of the possible side effects.    -We will prescribe Lexapro 10 mg at nighttime. Instructed patient to call the clinic in 3 to 4 weeks to follow-up on her depression symptoms.    - Follow up in the clinic in 4 to 5 months  - Instructed patient to call the clinic if symptoms worsen or develop any new symptoms. I have spent total 32  minutes  with the patient more than 50% of this time was spent reviewing medical records, discussing imaging findings, counseling regarding medications side effects and compliance, healthy habits and coordinating care.         Electronically signed by Ean Hammer MD on 2/9/2021 at 11:48 AM

## 2021-06-16 ENCOUNTER — OFFICE VISIT (OUTPATIENT)
Dept: NEUROLOGY | Age: 66
End: 2021-06-16
Payer: MEDICARE

## 2021-06-16 VITALS — RESPIRATION RATE: 16 BRPM | DIASTOLIC BLOOD PRESSURE: 72 MMHG | HEART RATE: 78 BPM | SYSTOLIC BLOOD PRESSURE: 110 MMHG

## 2021-06-16 DIAGNOSIS — Z86.59 HISTORY OF DEPRESSION: ICD-10-CM

## 2021-06-16 DIAGNOSIS — R20.2 PARESTHESIA OF BOTH FEET: Primary | ICD-10-CM

## 2021-06-16 DIAGNOSIS — G62.9 NEUROPATHY: ICD-10-CM

## 2021-06-16 PROCEDURE — 99213 OFFICE O/P EST LOW 20 MIN: CPT | Performed by: STUDENT IN AN ORGANIZED HEALTH CARE EDUCATION/TRAINING PROGRAM

## 2021-06-16 RX ORDER — ESCITALOPRAM OXALATE 10 MG/1
10 TABLET ORAL DAILY
Qty: 30 TABLET | Refills: 5 | Status: SHIPPED | OUTPATIENT
Start: 2021-06-16 | End: 2021-09-21 | Stop reason: SDUPTHER

## 2021-06-16 RX ORDER — GABAPENTIN 100 MG/1
200 CAPSULE ORAL 2 TIMES DAILY
Qty: 120 CAPSULE | Refills: 5 | Status: SHIPPED | OUTPATIENT
Start: 2021-06-16 | End: 2021-09-21 | Stop reason: SDUPTHER

## 2021-06-16 ASSESSMENT — ENCOUNTER SYMPTOMS
ABDOMINAL PAIN: 0
EYE DISCHARGE: 0
SHORTNESS OF BREATH: 0
SORE THROAT: 0
COUGH: 0
CONSTIPATION: 0
EYE PAIN: 0
PHOTOPHOBIA: 0
DIARRHEA: 0
VOMITING: 0
EYE REDNESS: 0
NAUSEA: 0
SINUS PAIN: 0

## 2021-06-16 NOTE — PROGRESS NOTES
[address] [place] [address], [facility] [address], [place] [address], [facility], [address] [place] [address]  P: [phone]  F: [phone]    NEUROLOGY CLINIC NOTE     PATIENT NAME: Kory Sigala  PATIENT MRN: [id number]  PRIMARY CARE PHYSICIAN: No primary care provider on file. Interval History: 6/16/2021  Patient was last seen in the clinic in February 2021. Since last visit continues to have overall improvement in the paresthesias with gabapentin. Currently taking gabapentin 200 mg twice a day, was able to tolerate better than 300 mg. Denies any side effect at this dose. During last visit she was started on Lexapro for depression, endorses improvement in depression with Lexapro. Denies any side effects from that. Denies any other new neurologic concerns. Interval History: 2/9/2021  Patient was last seen in the clinic in October 2020. Since last clinic visit endorses overall improvement in the paresthesias in the hands and the feet. Currently she is taking gabapentin 200 mg twice a day, denies any side effects from the medication, was not able to tolerate 300 mg, was sweaty with that. She endorses worsening depression for last couple of months, endorses significant improvement in her depression with Lexapro which she had taken in the past, would like to try that medication again. Denies any other new neurologic concerns during this visit. Denies any other changes in her medications. Denies any new allergies. Interval History: 10/6/2020  Patient was last seen in the clinic in June 2020. Since last visit overall she endorses significant improvement in her paresthesias in her hands and feet. As per patient if she missed her doses endorses worsening of the symptoms with that. Currently she is taking gabapentin 200 mg in the morning and 200 mg at nighttime. Denies any side effects of the medications. Patient endorses feeling sweaty with 300 mg gabapentin at nighttime.   Otherwise denies any denies any difficulty with walking. Denies any back pain or radicular symptoms. Denies any neck pain or radicular symptoms. Denies any symptoms in bilateral upper extremities. Patient also complains of bilateral lower extremity numbness especially on waking up in the morning, she feels as if her legs asleep, it improves after 10 minutes. Denies any saddle anesthesia or perineal numbness or problem with bowel bladder function. Denies taking any medications for the paresthesias. PATIENT HISTORY:     Past Medical History:   Diagnosis Date    Anxiety     Depression         No past surgical history on file. Social History     Socioeconomic History    Marital status: Single     Spouse name: Not on file    Number of children: Not on file    Years of education: Not on file    Highest education level: Not on file   Occupational History    Not on file   Tobacco Use    Smoking status: Current Every Day Smoker     Packs/day: 1.50     Years: 30.00     Pack years: 45.00     Types: Cigarettes     Last attempt to quit: 2014     Years since quittin.4    Smokeless tobacco: Never Used   Substance and Sexual Activity    Alcohol use: No    Drug use: No    Sexual activity: Never   Other Topics Concern    Not on file   Social History Narrative    Not on file     Social Determinants of Health     Financial Resource Strain:     Difficulty of Paying Living Expenses:    Food Insecurity:     Worried About Running Out of Food in the Last Year:     Ran Out of Food in the Last Year:    Transportation Needs:     Lack of Transportation (Medical):      Lack of Transportation (Non-Medical):    Physical Activity:     Days of Exercise per Week:     Minutes of Exercise per Session:    Stress:     Feeling of Stress :    Social Connections:     Frequency of Communication with Friends and Family:     Frequency of Social Gatherings with Friends and Family:     Attends Denominational Services:     Active Member of Clubs or Organizations:     Attends Club or Organization Meetings:     Marital Status:    Intimate Partner Violence:     Fear of Current or Ex-Partner:     Emotionally Abused:     Physically Abused:     Sexually Abused:         Current Outpatient Medications   Medication Sig Dispense Refill    escitalopram (LEXAPRO) 10 MG tablet Take 1 tablet by mouth daily 30 tablet 5    gabapentin (NEURONTIN) 100 MG capsule Take 2 capsules by mouth 2 times daily for 30 days. Intended supply: 30 days 120 capsule 5    FLUoxetine (PROZAC) 20 MG tablet TAKE 1 TABLET DAILY (Patient not taking: Reported on 2/20/2020) 90 tablet 3    acyclovir (ZOVIRAX) 200 MG capsule TAKE 2 CAPSULES BY MOUTH FIVE TIMES DAILY (Patient not taking: Reported on 2/20/2020) 50 capsule 2    HYDROcodone-acetaminophen (NORCO) 5-325 MG per tablet Take 1 tablet by mouth every 6 hours as needed for Pain (Patient not taking: Reported on 2/20/2020) 20 tablet 0     No current facility-administered medications for this visit. No Known Allergies     REVIEW OF SYSTEMS:     Review of Systems   Constitutional: Negative for chills, diaphoresis, fever and unexpected weight change. HENT: Negative for congestion, ear discharge, ear pain, hearing loss, sinus pain and sore throat. Eyes: Negative for photophobia, pain, discharge, redness and visual disturbance. Respiratory: Negative for cough and shortness of breath. Cardiovascular: Negative for chest pain, palpitations and leg swelling. Gastrointestinal: Negative for abdominal pain, constipation, diarrhea, nausea and vomiting. Endocrine: Negative for polydipsia and polyuria. Genitourinary: Negative for difficulty urinating and hematuria. Musculoskeletal: Negative for neck pain. Skin: Negative for pallor and rash. Neurological: Positive for weakness and numbness. Negative for dizziness, tremors, seizures, syncope, facial asymmetry, speech difficulty, light-headedness and headaches. Hematological: Does not bruise/bleed easily. Psychiatric/Behavioral: Negative for agitation, behavioral problems, decreased concentration, dysphoric mood, hallucinations and sleep disturbance. VITALS  /72 (Site: Right Upper Arm, Position: Sitting, Cuff Size: Medium Adult)   Pulse 78   Resp 16      PHYSICAL EXAMINATION:     Constitutional: Well developed and in no acute distress. Head:  normocephalic. Neck: supple, no carotid bruits  Respiratory: Clear to auscultation bilaterally. Cardiovascular: normal rate, regular rhythm. Abdomen: Soft, nontender, nondistended, normal bowel sounds  Extremities:  peripheral pulses palpable, no pedal edema   Psych: normal affect      NEUROLOGICAL EXAMINATION:     Mental status   Alert and oriented; intact memory with no confusion, speech or language problems; no hallucinations or delusions     Cranial nerves   II - visual fields intact to confrontation                                                III, IV, VI  extra-ocular muscles full: no pupillary defect; no ROSINA, no nystagmus, no ptosis   V - normal facial sensation                                                               VII - normal facial symmetry                                                             VIII - intact hearing                                                                             IX, X - symmetrical palate                                                                  XI - symmetrical shoulder shrug                                                       XII - midline tongue without atrophy or fasciculation     Motor function  Normal muscle bulk and tone  Muscle strength: normal power 5/5 except for LLE 5-/5 and RLE 5-/5       Sensory function Intact to touch, pin prick, vibration, proprioception in bilateral upper  extremities. Decreased light touch and pin prick in bilateral lower extremities in stocking distribution.       Cerebellar  impaired fine hand movements. Finger-to-nose intact bilaterally  Intermittent postural tremors noted on exam bilaterally, worse on the left side as compared to the right side, slightly improves with some distraction. Reflex function brisk reflexes bilaterally. Negative Babinski     Gait                  Normal station and gait           PRIOR TESTS AND IMAGING: Following images and Labs were reviewed by the examiner       2/20/2020  Vitamin B12 998  Folate 4.6  TSH 4.08  SPEP no M band seen  UPEP-elevated protein concentration  HbA1c 4.9  Serum copper 166.2  Ceruloplasmin 36      ASSESSMENT / PLAN:     Kory Sigala is a 75 y.o. left handed  female with PMH significant for anxiety and depression was seen in the clinic for neuropathy    · Possible peripheral polyneuropathy. Pain and paresthesias in bilateral feet. · Worsening depression  · History of anxiety and depression   · COPD    2/20/2020  Vitamin B12 998  Folate 4.6  TSH 4.08  SPEP no M band seen  UPEP-elevated protein concentration  HbA1c 4.9  Serum copper 166.2  Ceruloplasmin 36    PLAN:     -Patient does not want to do EMG at present as her symptoms are better with gabapentin.    -Continue gabapentin 200 mg twice a day. Discussed possible side effects of sedation, drowsiness and dizziness, ataxia with the patient. Instructed patient to call the clinic if develop any of the possible side effects.    -Continue  Lexapro 10 mg at nighttime    - Follow up in the clinic in 4 to 5 months  - Instructed patient to call the clinic if symptoms worsen or develop any new symptoms. I have spent total 21 minutes with the patient more than 50% of this time was spent reviewing medical records, discussing imaging findings, counseling regarding medications side effects and compliance, healthy habits and coordinating care and documentation.      Electronically signed by King Gamaliel MD on 6/16/2021 at 11:34 AM

## 2021-09-21 ENCOUNTER — OFFICE VISIT (OUTPATIENT)
Dept: NEUROLOGY | Age: 66
End: 2021-09-21
Payer: MEDICARE

## 2021-09-21 VITALS
BODY MASS INDEX: 18.68 KG/M2 | SYSTOLIC BLOOD PRESSURE: 126 MMHG | HEIGHT: 67 IN | HEART RATE: 82 BPM | DIASTOLIC BLOOD PRESSURE: 84 MMHG | WEIGHT: 119 LBS

## 2021-09-21 DIAGNOSIS — R20.2 PARESTHESIA OF BOTH FEET: Primary | ICD-10-CM

## 2021-09-21 DIAGNOSIS — G62.9 NEUROPATHY: ICD-10-CM

## 2021-09-21 DIAGNOSIS — Z86.59 HISTORY OF DEPRESSION: ICD-10-CM

## 2021-09-21 PROCEDURE — 99214 OFFICE O/P EST MOD 30 MIN: CPT | Performed by: STUDENT IN AN ORGANIZED HEALTH CARE EDUCATION/TRAINING PROGRAM

## 2021-09-21 RX ORDER — GABAPENTIN 100 MG/1
200 CAPSULE ORAL 2 TIMES DAILY
Qty: 120 CAPSULE | Refills: 1 | Status: SHIPPED | OUTPATIENT
Start: 2021-09-21 | End: 2021-09-21

## 2021-09-21 RX ORDER — ESCITALOPRAM OXALATE 10 MG/1
10 TABLET ORAL DAILY
Qty: 30 TABLET | Refills: 1 | Status: SHIPPED | OUTPATIENT
Start: 2021-09-21 | End: 2021-09-21

## 2021-09-21 RX ORDER — ESCITALOPRAM OXALATE 10 MG/1
10 TABLET ORAL DAILY
Qty: 30 TABLET | Refills: 3 | Status: SHIPPED | OUTPATIENT
Start: 2021-09-21 | End: 2022-01-27 | Stop reason: ALTCHOICE

## 2021-09-21 RX ORDER — GABAPENTIN 100 MG/1
200 CAPSULE ORAL 2 TIMES DAILY
Qty: 120 CAPSULE | Refills: 3 | Status: SHIPPED | OUTPATIENT
Start: 2021-09-21 | End: 2022-01-27 | Stop reason: ALTCHOICE

## 2021-09-21 ASSESSMENT — ENCOUNTER SYMPTOMS
EYE REDNESS: 0
VOMITING: 0
DIARRHEA: 0
CONSTIPATION: 0
SINUS PAIN: 0
SHORTNESS OF BREATH: 0
ABDOMINAL PAIN: 0
SORE THROAT: 0
PHOTOPHOBIA: 0
EYE DISCHARGE: 0
NAUSEA: 0
COUGH: 0
EYE PAIN: 0

## 2021-09-21 NOTE — PROGRESS NOTES
[address] [place] [address], [facility], [facility] [address], [facility], [address] [place] [address]  P: [phone]  F: [phone]    NEUROLOGY CLINIC NOTE     PATIENT NAME: Kimmie Quinones  PATIENT MRN: [id number]  PRIMARY CARE PHYSICIAN: No primary care provider on file. Interval History: 9/21/2021  Patient was last seen in the clinic in June 2021. Since last visit she continues to have improvement in the paresthesias with gabapentin. Currently she is taking gabapentin 200 mg twice a day. Denies any side effects on this medications. She is also taking Lexapro 10 mg daily for depression. Endorses improvement in the depression with Lexapro. Denies any side effects from that. Denies any other new neurologic concerns during this clinic visit. Denies any other changes in her medications. Denies any new allergies. Interval History: 6/16/2021  Patient was last seen in the clinic in February 2021. Since last visit continues to have overall improvement in the paresthesias with gabapentin. Currently taking gabapentin 200 mg twice a day, was able to tolerate better than 300 mg. Denies any side effect at this dose. During last visit she was started on Lexapro for depression, endorses improvement in depression with Lexapro. Denies any side effects from that. Denies any other new neurologic concerns. Interval History: 2/9/2021  Patient was last seen in the clinic in October 2020. Since last clinic visit endorses overall improvement in the paresthesias in the hands and the feet. Currently she is taking gabapentin 200 mg twice a day, denies any side effects from the medication, was not able to tolerate 300 mg, was sweaty with that. She endorses worsening depression for last couple of months, endorses significant improvement in her depression with Lexapro which she had taken in the past, would like to try that medication again.   Denies any other new neurologic concerns during this visit. Denies any other changes in her medications. Denies any new allergies. Interval History: 10/6/2020  Patient was last seen in the clinic in June 2020. Since last visit overall she endorses significant improvement in her paresthesias in her hands and feet. As per patient if she missed her doses endorses worsening of the symptoms with that. Currently she is taking gabapentin 200 mg in the morning and 200 mg at nighttime. Denies any side effects of the medications. Patient endorses feeling sweaty with 300 mg gabapentin at nighttime. Otherwise denies any other new neurologic concerns, denies any changes in her other medications. Denies any other new allergies. She still does not want to do EMG nerve conduction study. Interval history 6/4/2020  Patient was last seen in the clinic in February 2020, during the last visit she was started on gabapentin for neuropathy, she was prescribed 300 mg at nighttime with gradual dose titration. Currently she is taking only 200 mg in the morning, as per patient she did not feel that she needed 300 mg. Endorses improvement in her paresthesias with gabapentin though still having intermittent episodes of pain in paresthesias in her feet. EMG nerve conduction study was ordered during last visit, she does not want to do the procedure as it is painful. Lab work-up from last visit showed slightly elevated copper level, denies taking any copper supplements. Ceruloplasmin level was normal.  Discussed SPEP and UPEP results with the patient. Patient denies any other concerns during this visit.     Notes from 2/20/2020  HPI:      Robert Ocasio is a 77 y.o. left handed  female with PMH significant for anxiety and depression was seen in the clinic for neuropathy    History obtained from Patient    Around 4 years ago patient fell down and hurt her ankle, she was found to have left malleolar fracture, initially was treated with the cast but was later given boot for that. But after 1 month she started having tingling and numbness in the lateral aspect of the left foot, gradually getting worse then after couple of months she fell down and got hurt on the right leg. After couple of months started having tingling and numbness on the right feet too. Initially she was having intermittent paresthesias in her feet, gradually getting worse, now she is having constant paresthesias in her feet along with burning pain at the top of the feet, sometimes endorses weakness in the legs especially when walking for a long period of time, denies any difficulty with walking. Denies any back pain or radicular symptoms. Denies any neck pain or radicular symptoms. Denies any symptoms in bilateral upper extremities. Patient also complains of bilateral lower extremity numbness especially on waking up in the morning, she feels as if her legs asleep, it improves after 10 minutes. Denies any saddle anesthesia or perineal numbness or problem with bowel bladder function. Denies taking any medications for the paresthesias. PATIENT HISTORY:     Past Medical History:   Diagnosis Date    Anxiety     Depression         No past surgical history on file.      Social History     Socioeconomic History    Marital status: Single     Spouse name: Not on file    Number of children: Not on file    Years of education: Not on file    Highest education level: Not on file   Occupational History    Not on file   Tobacco Use    Smoking status: Current Every Day Smoker     Packs/day: 1.50     Years: 30.00     Pack years: 45.00     Types: Cigarettes     Last attempt to quit: 2014     Years since quittin.7    Smokeless tobacco: Never Used   Substance and Sexual Activity    Alcohol use: No    Drug use: No    Sexual activity: Never   Other Topics Concern    Not on file   Social History Narrative    Not on file     Social Determinants of Health     Financial Resource Strain:     Difficulty of Paying Living Expenses:    Food Insecurity:     Worried About Running Out of Food in the Last Year:     [address] in the Last Year:    Transportation Needs:     Lack of Transportation (Medical):  Lack of Transportation (Non-Medical):    Physical Activity:     Days of Exercise per Week:     Minutes of Exercise per Session:    Stress:     Feeling of Stress :    Social Connections:     Frequency of Communication with Friends and Family:     Frequency of Social Gatherings with Friends and Family:     Attends Faith Services:     Active Member of Clubs or Organizations:     Attends Club or Organization Meetings:     Marital Status:    Intimate Partner Violence:     Fear of Current or Ex-Partner:     Emotionally Abused:     Physically Abused:     Sexually Abused:         Current Outpatient Medications   Medication Sig Dispense Refill    escitalopram (LEXAPRO) 10 MG tablet Take 1 tablet by mouth daily 30 tablet 3    gabapentin (NEURONTIN) 100 MG capsule Take 2 capsules by mouth 2 times daily for 30 days. Intended supply: 30 days 120 capsule 3    FLUoxetine (PROZAC) 20 MG tablet TAKE 1 TABLET DAILY (Patient not taking: Reported on 2/20/2020) 90 tablet 3    acyclovir (ZOVIRAX) 200 MG capsule TAKE 2 CAPSULES BY MOUTH FIVE TIMES DAILY (Patient not taking: Reported on 2/20/2020) 50 capsule 2    HYDROcodone-acetaminophen (NORCO) 5-325 MG per tablet Take 1 tablet by mouth every 6 hours as needed for Pain (Patient not taking: Reported on 2/20/2020) 20 tablet 0     No current facility-administered medications for this visit. No Known Allergies     REVIEW OF SYSTEMS:     Review of Systems   Constitutional: Negative for chills, diaphoresis, fever and unexpected weight change. HENT: Negative for congestion, ear discharge, ear pain, hearing loss, sinus pain and sore throat. Eyes: Negative for photophobia, pain, discharge, redness and visual disturbance.    Respiratory: Negative for cough and shortness of breath. Cardiovascular: Negative for chest pain, palpitations and leg swelling. Gastrointestinal: Negative for abdominal pain, constipation, diarrhea, nausea and vomiting. Endocrine: Negative for polydipsia and polyuria. Genitourinary: Negative for difficulty urinating and hematuria. Musculoskeletal: Negative for neck pain. Skin: Negative for pallor and rash. Neurological: Positive for weakness and numbness. Negative for dizziness, tremors, seizures, syncope, facial asymmetry, speech difficulty, light-headedness and headaches. Hematological: Does not bruise/bleed easily. Psychiatric/Behavioral: Negative for agitation, behavioral problems, decreased concentration, dysphoric mood, hallucinations and sleep disturbance. VITALS  /84 (Site: Right Upper Arm, Position: Sitting, Cuff Size: Medium Adult)   Pulse 82   Ht 5' 7\" (1.702 m)   Wt 119 lb (54 kg)   BMI 18.64 kg/m²      PHYSICAL EXAMINATION:     Constitutional: Well developed and in no acute distress. Head:  normocephalic. Neck: supple, no carotid bruits  Respiratory: Clear to auscultation bilaterally. Cardiovascular: normal rate, regular rhythm.   Abdomen: Soft, nontender, nondistended, normal bowel sounds  Extremities:  peripheral pulses palpable, no pedal edema   Psych: normal affect      NEUROLOGICAL EXAMINATION:     Mental status   Alert and oriented; intact memory with no confusion, speech or language problems; no hallucinations or delusions     Cranial nerves   II - visual fields intact to confrontation                                                III, IV, VI - extra-ocular muscles full: no pupillary defect; no ROSINA, no nystagmus, no ptosis   V - normal facial sensation                                                               VII - normal facial symmetry                                                             VIII - intact hearing IX, X - symmetrical palate                                                                  XI - symmetrical shoulder shrug                                                       XII - midline tongue without atrophy or fasciculation     Motor function  Normal muscle bulk and tone  Muscle strength: normal power 5/5 except for LLE 5-/5 and RLE 5-/5       Sensory function Intact to touch, pin prick, vibration, proprioception in bilateral upper  extremities. Decreased light touch and pin prick in bilateral lower extremities in stocking distribution. Cerebellar  impaired fine hand movements. Finger-to-nose intact bilaterally  Intermittent postural tremors noted on exam bilaterally, worse on the left side as compared to the right side, slightly improves with some distraction. Reflex function brisk reflexes bilaterally. Negative Babinski     Gait                  Normal station and gait           PRIOR TESTS AND IMAGING: Following images and Labs were reviewed by the examiner       2/20/2020  Vitamin B12 998  Folate 4.6  TSH 4.08  SPEP no M band seen  UPEP-elevated protein concentration  HbA1c 4.9  Serum copper 166.2  Ceruloplasmin 36      ASSESSMENT / PLAN:     Zipporah Siemens is a 75 y.o. left handed  female with PMH significant for anxiety and depression was seen in the clinic for neuropathy    · Possible peripheral polyneuropathy. Pain and paresthesias in bilateral feet. · Worsening depression  · History of anxiety and depression   · COPD    2/20/2020  Vitamin B12 998  Folate 4.6  TSH 4.08  SPEP no M band seen  UPEP-elevated protein concentration  HbA1c 4.9  Serum copper 166.2  Ceruloplasmin 36    PLAN:     -Patient does not want to do EMG at present as her symptoms are better with gabapentin.    -Continue gabapentin 200 mg twice a day. Discussed possible side effects of sedation, drowsiness and dizziness, ataxia with the patient.   Instructed patient to call the clinic if develop any of the possible side effects.    -Continue  Lexapro 10 mg at nighttime    - Follow up in the clinic in 4 to 5 months  - Instructed patient to call the clinic if symptoms worsen or develop any new symptoms.          Electronically signed by Prince Del Angel MD on 9/21/2021 at 11:45 AM

## 2022-01-27 ENCOUNTER — OFFICE VISIT (OUTPATIENT)
Dept: NEUROLOGY | Age: 67
End: 2022-01-27
Payer: MEDICARE

## 2022-01-27 VITALS
DIASTOLIC BLOOD PRESSURE: 87 MMHG | TEMPERATURE: 97.6 F | RESPIRATION RATE: 16 BRPM | SYSTOLIC BLOOD PRESSURE: 121 MMHG | HEART RATE: 98 BPM

## 2022-01-27 DIAGNOSIS — G62.9 NEUROPATHY: Primary | ICD-10-CM

## 2022-01-27 PROCEDURE — 99213 OFFICE O/P EST LOW 20 MIN: CPT | Performed by: STUDENT IN AN ORGANIZED HEALTH CARE EDUCATION/TRAINING PROGRAM

## 2022-01-27 RX ORDER — ESCITALOPRAM OXALATE 10 MG/1
10 TABLET ORAL DAILY
Qty: 90 TABLET | Refills: 1 | Status: SHIPPED | OUTPATIENT
Start: 2022-01-27 | End: 2022-07-30 | Stop reason: SDUPTHER

## 2022-01-27 RX ORDER — GABAPENTIN 100 MG/1
200 CAPSULE ORAL 2 TIMES DAILY
Qty: 360 CAPSULE | Refills: 1 | Status: SHIPPED | OUTPATIENT
Start: 2022-01-27 | End: 2022-07-30 | Stop reason: SDUPTHER

## 2022-01-27 RX ORDER — FOLIC ACID 1 MG/1
1 TABLET ORAL DAILY
Qty: 90 TABLET | Refills: 1 | Status: SHIPPED | OUTPATIENT
Start: 2022-01-27

## 2022-01-27 RX ORDER — GABAPENTIN 100 MG/1
200 CAPSULE ORAL 2 TIMES DAILY
Qty: 360 CAPSULE | Refills: 0 | Status: SHIPPED | OUTPATIENT
Start: 2022-01-27 | End: 2022-01-27

## 2022-01-27 ASSESSMENT — ENCOUNTER SYMPTOMS
CHEST TIGHTNESS: 0
COUGH: 0
SHORTNESS OF BREATH: 0
VOMITING: 0
ABDOMINAL PAIN: 0
EYE PAIN: 0
SINUS PAIN: 0
ABDOMINAL DISTENTION: 0
NAUSEA: 0
APNEA: 0

## 2022-01-27 ASSESSMENT — VISUAL ACUITY: VA_NORMAL: 1

## 2022-01-27 NOTE — PATIENT INSTRUCTIONS
1. Obtain liver ultrasound   2. Obtain lab work including CBC, LFT, folate, urine copper and serum copper  3. Continue gabapentin 200mg BID and lexapro 10mg daily  4.  Start taking folic acid supplement 1mg daily

## 2022-01-27 NOTE — PROGRESS NOTES
[address] [place] [address], [facility] [address], [facility], [facility], [address] [address]  P: [phone]  F: [phone]    NEUROLOGY CLINIC NOTE     PATIENT NAME: Sydnie Alberts  PATIENT MRN: [id number]  PRIMARY CARE PHYSICIAN: No primary care provider on file. HPI:      Sydnie Alberts is a 74 y.o. female who presents to clinic today as a new patient for me but has been following Dr. Ag Ya since 2/20/2020 for neuropathy. PMH significant for anxiety/depression and neuropathy of the feet. Initally in 2016 patient fell down hurting her ankle found to have left malleolar fracture requiring cast no surgical intervention. She began having tingling and numbness in the left foot lateral aspect within 1 month of injury and eventually fell down hurting right leg also. She noted tingling and numbness of both feet gradually worse over time since her fracture that was progressive. Patient denied any back pain or radicular pain also does not have any neck or radicular pain in her upper extremities. Denies bilateral upper extremity numbness although does have bilateral lower extremity numbness with associated burning sensations. Patient was started on gabapentin with basic neuropathy labs showing slightly elevated copper level normal ceruloplasmin. Protein electrophoresis of the urine also completed unremarkable, folate 4.6, vitamin B12 998, TSH 4.08 HbA1c 4.9. Patient currently on gabapentin 200 mg BID had adverse side effects with 300 mg previously. She also is on Lexapro 10 mg daily for depression. Patient does have EMG ordered since February 2020 although did not want to get this completed. Patients symptoms well controlled on gabapentin 200mg BID and lexapro and she does not want to make any changes in her current medications. Patient denies any progression or worsening of her neuropathy in her lower extremities.      Copper  2/20/2020-166.2  10/5/2020-184.9    Folate   2/20/2020-4.6 PREVIOUS WORKUP:     No results found for: WBC, HGB, HCT, MCV, PLT    Past Medical History:   Diagnosis Date    Anxiety     Depression         No past surgical history on file. Social History     Socioeconomic History    Marital status: Single     Spouse name: Not on file    Number of children: Not on file    Years of education: Not on file    Highest education level: Not on file   Occupational History    Not on file   Tobacco Use    Smoking status: Current Every Day Smoker     Packs/day: 1.50     Years: 30.00     Pack years: 45.00     Types: Cigarettes     Last attempt to quit: 2014     Years since quittin.0    Smokeless tobacco: Never Used   Substance and Sexual Activity    Alcohol use: No    Drug use: No    Sexual activity: Never   Other Topics Concern    Not on file   Social History Narrative    Not on file     Social Determinants of Health     Financial Resource Strain:     Difficulty of Paying Living Expenses: Not on file   Food Insecurity:     Worried About Running Out of Food in the Last Year: Not on file    [place] of Food in the Last Year: Not on file   Transportation Needs:     Lack of Transportation (Medical): Not on file    Lack of Transportation (Non-Medical):  Not on file   Physical Activity:     Days of Exercise per Week: Not on file    Minutes of Exercise per Session: Not on file   Stress:     Feeling of Stress : Not on file   Social Connections:     Frequency of Communication with Friends and Family: Not on file    Frequency of Social Gatherings with Friends and Family: Not on file    Attends Tenriism Services: Not on file    Active Member of Clubs or Organizations: Not on file    Attends Club or Organization Meetings: Not on file    Marital Status: Not on file   Intimate Partner Violence:     Fear of Current or Ex-Partner: Not on file    Emotionally Abused: Not on file    Physically Abused: Not on file    Sexually Abused: Not on file   Housing Stability:     Unable to Pay for Housing in the Last Year: Not on file    Number of Places Lived in the Last Year: Not on file    Unstable Housing in the Last Year: Not on file        Current Outpatient Medications   Medication Sig Dispense Refill    escitalopram (LEXAPRO) 10 MG tablet Take 1 tablet by mouth daily 30 tablet 3    gabapentin (NEURONTIN) 100 MG capsule Take 2 capsules by mouth 2 times daily for 30 days. Intended supply: 30 days 120 capsule 3    FLUoxetine (PROZAC) 20 MG tablet TAKE 1 TABLET DAILY (Patient not taking: Reported on 2/20/2020) 90 tablet 3    acyclovir (ZOVIRAX) 200 MG capsule TAKE 2 CAPSULES BY MOUTH FIVE TIMES DAILY (Patient not taking: Reported on 2/20/2020) 50 capsule 2    HYDROcodone-acetaminophen (NORCO) 5-325 MG per tablet Take 1 tablet by mouth every 6 hours as needed for Pain (Patient not taking: Reported on 2/20/2020) 20 tablet 0     No current facility-administered medications for this visit. No Known Allergies     REVIEW OF SYSTEMS:     Review of Systems   Constitutional: Negative for activity change, appetite change, chills and fatigue. HENT: Negative for ear pain and sinus pain. Eyes: Negative for pain and visual disturbance. Respiratory: Negative for apnea, cough, chest tightness and shortness of breath. Cardiovascular: Negative for chest pain, palpitations and leg swelling. Gastrointestinal: Negative for abdominal distention, abdominal pain, nausea and vomiting. Endocrine: Negative for polydipsia and polyuria. Genitourinary: Negative for difficulty urinating and dysuria. Musculoskeletal: Negative for arthralgias and myalgias. Skin: Negative for rash. Allergic/Immunologic: Negative for environmental allergies. Neurological: Positive for numbness (lower extremities ). Negative for dizziness, facial asymmetry and headaches. Psychiatric/Behavioral: Negative for agitation and behavioral problems.         VITALS  /87   Pulse 98 Temp 97.6 °F (36.4 °C)   Resp 16      NEUROLOGICAL EXAMINATION:     Neurological Exam  Mental Status  Awake, alert and oriented to person, place and time. Speech is normal. Language is fluent with no aphasia. Cranial Nerves  CN II: Visual acuity is normal. Visual fields full to confrontation. CN III, IV, VI: Extraocular movements intact bilaterally. Normal lids and orbits bilaterally. Pupils equal round and reactive to light bilaterally. CN V: Facial sensation is normal.  CN VII: Full and symmetric facial movement. CN VIII: Hearing is normal.  CN IX, X: Palate elevates symmetrically. Normal gag reflex. CN XI: Shoulder shrug strength is normal.  CN XII: Tongue midline without atrophy or fasciculations. Motor  Normal muscle bulk throughout. No fasciculations present. Normal muscle tone. No abnormal involuntary movements. Strength is 5/5 throughout all four extremities. Sensory  Pinprick abnormality: Hyperesthesia bilateral lower extremities plantar > dorsal aspect of foot. .     Reflexes  Deep tendon reflexes are 2+ and symmetric in all four extremities with downgoing toes bilaterally. Coordination  Finger-to-nose, rapid alternating movements and heel-to-shin normal bilaterally without dysmetria. Gait  Normal casual, toe, heel and tandem gait. ASSESSMENT / PLAN:   Soumya Dallas is a 58-year-old  female who presents as a follow-up for bilateral lower extremity neuropathic pain. Past medical history significant for left malleolus ankle fracture 2016, distal lower extremity neuropathic pain, EtOH abuse sober since 2009, anxiety and depression. Patient symptoms have been well controlled nonprogressive since starting gabapentin 200 mg twice daily. Given her elevated copper and low folate we will repeat lab work we will also get liver ultrasound to rule out liver pathology as cause for copper levels. Follow-up in 6 months      Assessment  1. Distal lower extremity neuropathy  2.  Depression and anxiety well controlled  3. History of ETOH abuse 1/5th daily vodka sober since inpatient rehab 2009     Plan  -Obtain liver ultrasound   - Obtain lab work including CBC, LFT, folate, urine copper and serum copper  - Continue gabapentin 200mg BID and lexapro 10mg daily  - Start taking folic acid supplement 1mg daily  -Follow-up in 6 months        Ms. Natalya Andrea received counseling on the following healthy behaviors: medical compliance, smoking cessation, blood pressure control, regular follow up with primary doctor.         Electronically signed by Ana Humphrey MD on 1/27/2022 at 1:46 PM

## 2022-02-02 ENCOUNTER — HOSPITAL ENCOUNTER (OUTPATIENT)
Dept: ULTRASOUND IMAGING | Age: 67
Discharge: HOME | End: 2022-02-04
Payer: MEDICARE

## 2022-02-02 DIAGNOSIS — G62.9 NEUROPATHY: ICD-10-CM

## 2022-02-02 PROCEDURE — 76705 ECHO EXAM OF ABDOMEN: CPT

## 2022-07-29 DIAGNOSIS — E83.00 COPPER METABOLISM DISORDER: Primary | ICD-10-CM

## 2022-07-29 NOTE — TELEPHONE ENCOUNTER
Patient calling to requesting refill for gabapentin and lexapro. Please review and e-scribe if applicable.      Last Visit Date: 1.27.2022    Next Visit Date:  Future Appointments   Date Time Provider Lemuel Aguilera   9/15/2022  3:00 PM Kimberly Chris MD Neuro [facility]

## 2022-07-30 RX ORDER — GABAPENTIN 100 MG/1
200 CAPSULE ORAL 2 TIMES DAILY
Qty: 360 CAPSULE | Refills: 1 | Status: SHIPPED | OUTPATIENT
Start: 2022-07-30 | End: 2022-10-28

## 2022-07-30 RX ORDER — ESCITALOPRAM OXALATE 10 MG/1
10 TABLET ORAL DAILY
Qty: 90 TABLET | Refills: 1 | Status: SHIPPED | OUTPATIENT
Start: 2022-07-30

## 2022-07-30 NOTE — TELEPHONE ENCOUNTER
Called patient around 7:00PM and discussed her medication refill. Patient complaining of worse pain in her feet at night when she is sleeping however reluctant to increase her medication dose as she does not want to take more medications thus discussed she can take 300mg at bed time and 100mg in the morning. Patient will try. Also discussed that her lab work including serum, urine copper and LFT are not completed yet and patient states she will go get her labs drawn this week.  She did complete her US liver 2/2/22 which showed enlarged liver with non-specific heterogenous echotexture concerning for liver disease so once labs are completed likely will need GI referral.     Shira Curtis MD  PGY-4 Neurology Resident  [place] [address]. Lemuel Reyna, [place] [facility]  [phone]

## 2022-09-08 ENCOUNTER — HOSPITAL ENCOUNTER (OUTPATIENT)
Age: 67
Setting detail: SPECIMEN
Discharge: HOME | End: 2022-09-08

## 2022-09-08 DIAGNOSIS — G62.9 NEUROPATHY: ICD-10-CM

## 2022-09-08 DIAGNOSIS — E83.00 COPPER METABOLISM DISORDER: ICD-10-CM

## 2022-09-08 LAB
ABSOLUTE EOS #: 0.17 K/UL (ref 0–0.44)
ABSOLUTE IMMATURE GRANULOCYTE: 0.14 K/UL (ref 0–0.3)
ABSOLUTE LYMPH #: 2.25 K/UL (ref 1.1–3.7)
ABSOLUTE MONO #: 0.73 K/UL (ref 0.1–1.2)
ALBUMIN SERPL-MCNC: 3.5 G/DL (ref 3.5–5.2)
ALBUMIN/GLOBULIN RATIO: 1.2 (ref 1–2.5)
ALP BLD-CCNC: 152 U/L (ref 35–104)
ALT SERPL-CCNC: 10 U/L (ref 5–33)
ANION GAP SERPL CALCULATED.3IONS-SCNC: 13 MMOL/L (ref 9–17)
AST SERPL-CCNC: 19 U/L
BASOPHILS # BLD: 1 % (ref 0–2)
BASOPHILS ABSOLUTE: 0.1 K/UL (ref 0–0.2)
BILIRUB SERPL-MCNC: <0.1 MG/DL (ref 0.3–1.2)
BUN BLDV-MCNC: 8 MG/DL (ref 8–23)
CALCIUM SERPL-MCNC: 8.8 MG/DL (ref 8.6–10.4)
CERULOPLASMIN: 34 MG/DL (ref 16–45)
CHLORIDE BLD-SCNC: 105 MMOL/L (ref 98–107)
CO2: 20 MMOL/L (ref 20–31)
CREAT SERPL-MCNC: 0.51 MG/DL (ref 0.5–0.9)
EOSINOPHILS RELATIVE PERCENT: 2 % (ref 1–4)
FOLATE: 11.3 NG/ML
GFR AFRICAN AMERICAN: >60 ML/MIN
GFR NON-AFRICAN AMERICAN: >60 ML/MIN
GFR SERPL CREATININE-BSD FRML MDRD: ABNORMAL ML/MIN/{1.73_M2}
GLUCOSE BLD-MCNC: 141 MG/DL (ref 70–99)
HCT VFR BLD CALC: 44.2 % (ref 36.3–47.1)
HEMOGLOBIN: 13.7 G/DL (ref 11.9–15.1)
IMMATURE GRANULOCYTES: 2 %
LYMPHOCYTES # BLD: 24 % (ref 24–43)
MCH RBC QN AUTO: 32.6 PG (ref 25.2–33.5)
MCHC RBC AUTO-ENTMCNC: 31 G/DL (ref 28.4–34.8)
MCV RBC AUTO: 105.2 FL (ref 82.6–102.9)
MONOCYTES # BLD: 8 % (ref 3–12)
NRBC AUTOMATED: 0 PER 100 WBC
PDW BLD-RTO: 14.3 % (ref 11.8–14.4)
PLATELET # BLD: 380 K/UL (ref 138–453)
PMV BLD AUTO: 9.7 FL (ref 8.1–13.5)
POTASSIUM SERPL-SCNC: 3 MMOL/L (ref 3.7–5.3)
RBC # BLD: 4.2 M/UL (ref 3.95–5.11)
RBC # BLD: ABNORMAL 10*6/UL
SEG NEUTROPHILS: 63 % (ref 36–65)
SEGMENTED NEUTROPHILS ABSOLUTE COUNT: 6.04 K/UL (ref 1.5–8.1)
SODIUM BLD-SCNC: 138 MMOL/L (ref 135–144)
TOTAL PROTEIN: 6.4 G/DL (ref 6.4–8.3)
VITAMIN B-12: 598 PG/ML (ref 232–1245)
WBC # BLD: 9.4 K/UL (ref 3.5–11.3)

## 2022-09-11 LAB
ARSENIC, BLOOD: <10 UG/L
CADMIUM: <1 UG/L
COPPER, URINE /24 HR: ABNORMAL UG/D (ref 3–45)
COPPER, URINE /VOLUME: 3.3 UG/DL
COPPER, URINE RATIO CREATININE: 28.2 UG/G CRT (ref 10–45)
CREATININE URINE /24 HR: ABNORMAL MG/D (ref 500–1400)
CREATININE URINE /VOLUME: 117 MG/DL
HOURS COLLECTED: ABNORMAL
LEAD BLOOD: 2 UG/DL (ref 0–4)
MERCURY, BLOOD: <2.5 UG/L
URINE VOLUME: ABNORMAL

## 2022-09-12 LAB
COPPER: 159.3 UG/DL (ref 80–155)
ZINC: 66 UG/DL (ref 60–120)

## 2022-09-15 ENCOUNTER — OFFICE VISIT (OUTPATIENT)
Dept: NEUROLOGY | Age: 67
End: 2022-09-15
Payer: MEDICARE

## 2022-09-15 VITALS
HEIGHT: 67 IN | WEIGHT: 118 LBS | OXYGEN SATURATION: 98 % | SYSTOLIC BLOOD PRESSURE: 112 MMHG | TEMPERATURE: 98.8 F | RESPIRATION RATE: 20 BRPM | HEART RATE: 80 BPM | BODY MASS INDEX: 18.52 KG/M2 | DIASTOLIC BLOOD PRESSURE: 77 MMHG

## 2022-09-15 DIAGNOSIS — R16.0 ENLARGEMENT OF LIVER: ICD-10-CM

## 2022-09-15 DIAGNOSIS — R79.0 ABNORMAL BLOOD LEVEL OF COPPER: ICD-10-CM

## 2022-09-15 DIAGNOSIS — G62.9 NEUROPATHY: Primary | ICD-10-CM

## 2022-09-15 PROCEDURE — 1123F ACP DISCUSS/DSCN MKR DOCD: CPT | Performed by: STUDENT IN AN ORGANIZED HEALTH CARE EDUCATION/TRAINING PROGRAM

## 2022-09-15 PROCEDURE — 99212 OFFICE O/P EST SF 10 MIN: CPT | Performed by: STUDENT IN AN ORGANIZED HEALTH CARE EDUCATION/TRAINING PROGRAM

## 2022-09-15 ASSESSMENT — ENCOUNTER SYMPTOMS
SHORTNESS OF BREATH: 0
ABDOMINAL PAIN: 0
CHEST TIGHTNESS: 0
ABDOMINAL DISTENTION: 0
APNEA: 0
COUGH: 0
SINUS PAIN: 0
VOMITING: 0
EYE PAIN: 0
NAUSEA: 0

## 2022-09-15 ASSESSMENT — VISUAL ACUITY: VA_NORMAL: 1

## 2022-09-15 NOTE — PATIENT INSTRUCTIONS
Continue gabapentin 100mg in the morning and 300mg at bed time  Establish care with GI for abnormal liver ultrasound and copper levels  Follow up with me in 1 year

## 2022-09-15 NOTE — PROGRESS NOTES
[address] [place] [address], [facility] [address], [facility], [facility], [address] [place] [address]  P: [phone]  F: [phone]    NEUROLOGY CLINIC NOTE     PATIENT NAME: Zay Kruger  PATIENT MRN: [id number]  PRIMARY CARE PHYSICIAN: No primary care provider on file. Interval clinic follow up 9/15/22      Zay Kruger is a 74 y.o. female who presents to clinic today as follow up from her last clinic visit 1/27/22 for history of neuropathy in both feet only up to her ankle. Symptoms are well controlled on gabapentin 100mg in the morning and 300mg at night time. Otherwise patient denies any changes or other concerns at this time. Of note, Patient admits to previously having alcoholism for 8-12 months 1/5th of vodka daily and went to rehab in 2009. She has not drank alcohol since then. Does  not have a PCP or see any other providers and currently on gabapentin and lexapro only. Liver US 2/2/22  Impression   Nonspecific heterogeneous echotexture of the liver without a focal lesion. Liver enlarged at 22 cm in length. Exam otherwise unremarkable. RECOMMENDATIONS:   Unavailable   Labs completed 9/8/22  Copper urine elevated 3.3     Folate 11.3  .2 Hgb 13.7  Zinc 66 () Heavy metal blood panel negative  Ceruloplasmin 35 (16-45)  Serum copper 159.3 improved from 10/2020 184.9  HPI 1/27/22    Zay Kruger is a 74 y.o. female who presents to clinic today as a new patient for me but has been following Dr. Lena Trivedi since 2/20/2020 for neuropathy. PMH significant for anxiety/depression and neuropathy of the feet. Initally in 2016 patient fell down hurting her ankle found to have left malleolar fracture requiring cast no surgical intervention. She began having tingling and numbness in the left foot lateral aspect within 1 month of injury and eventually fell down hurting right leg also.  She noted tingling and numbness of both feet gradually worse over time since her fracture that was progressive. Patient denied any back pain or radicular pain also does not have any neck or radicular pain in her upper extremities. Denies bilateral upper extremity numbness although does have bilateral lower extremity numbness with associated burning sensations. Patient was started on gabapentin with basic neuropathy labs showing slightly elevated copper level normal ceruloplasmin. Protein electrophoresis of the urine also completed unremarkable, folate 4.6, vitamin B12 998, TSH 4.08 HbA1c 4.9. Patient currently on gabapentin 200 mg BID had adverse side effects with 300 mg previously. She also is on Lexapro 10 mg daily for depression. Patient does have EMG ordered since 2020 although did not want to get this completed. Patients symptoms well controlled on gabapentin 200mg BID and lexapro and she does not want to make any changes in her current medications. Patient denies any progression or worsening of her neuropathy in her lower extremities. Copper  2020-166.2  10/5/2020-184.9     Folate   2020-4.6   PREVIOUS WORKUP:     Lab Results   Component Value Date    WBC 9.4 2022    HGB 13.7 2022    HCT 44.2 2022    .2 (H) 2022     2022       Past Medical History:   Diagnosis Date    Anxiety     Depression         No past surgical history on file.      Social History     Socioeconomic History    Marital status: Single     Spouse name: Not on file    Number of children: Not on file    Years of education: Not on file    Highest education level: Not on file   Occupational History    Not on file   Tobacco Use    Smoking status: Every Day     Packs/day: 1.50     Years: 30.00     Pack years: 45.00     Types: Cigarettes     Last attempt to quit: 2014     Years since quittin.7    Smokeless tobacco: Never   Substance and Sexual Activity    Alcohol use: No    Drug use: No    Sexual activity: Never   Other Topics Concern    Not on file Social History Narrative    Not on file     Social Determinants of Health     Financial Resource Strain: Not on file   Food Insecurity: Not on file   Transportation Needs: Not on file   Physical Activity: Not on file   Stress: Not on file   Social Connections: Not on file   Intimate Partner Violence: Not on file   Housing Stability: Not on file        Current Outpatient Medications   Medication Sig Dispense Refill    escitalopram (LEXAPRO) 10 MG tablet Take 1 tablet by mouth in the morning. 90 tablet 1    gabapentin (NEURONTIN) 100 MG capsule Take 2 capsules by mouth in the morning and 2 capsules before bedtime. Do all this for 90 days. 107 capsule 1    folic acid (FOLVITE) 1 MG tablet Take 1 tablet by mouth daily (Patient not taking: Reported on 9/15/2022) 90 tablet 1    acyclovir (ZOVIRAX) 200 MG capsule TAKE 2 CAPSULES BY MOUTH FIVE TIMES DAILY (Patient not taking: No sig reported) 50 capsule 2    HYDROcodone-acetaminophen (NORCO) 5-325 MG per tablet Take 1 tablet by mouth every 6 hours as needed for Pain (Patient not taking: Reported on 9/15/2022) 20 tablet 0     No current facility-administered medications for this visit. No Known Allergies     REVIEW OF SYSTEMS:     Review of Systems   Constitutional:  Negative for activity change, appetite change, chills and fatigue. HENT:  Negative for ear pain and sinus pain. Eyes:  Negative for pain and visual disturbance. Respiratory:  Negative for apnea, cough, chest tightness and shortness of breath. Cardiovascular:  Negative for chest pain, palpitations and leg swelling. Gastrointestinal:  Negative for abdominal distention, abdominal pain, nausea and vomiting. Endocrine: Negative for polydipsia and polyuria. Genitourinary:  Negative for difficulty urinating and dysuria. Musculoskeletal:  Negative for arthralgias and myalgias. Skin:  Negative for rash. Allergic/Immunologic: Negative for environmental allergies.    Neurological:  Negative for dizziness, facial asymmetry and headaches. Psychiatric/Behavioral:  Negative for agitation and behavioral problems. VITALS  /77 (Site: Left Upper Arm, Position: Sitting, Cuff Size: Medium Adult)   Pulse 80   Temp 98.8 °F (37.1 °C) (Oral)   Resp 20   Ht 5' 7\" (1.702 m)   Wt 118 lb (53.5 kg)   SpO2 98%   BMI 18.48 kg/m²        NEUROLOGICAL EXAMINATION:     Neurological Exam  Mental Status  Awake, alert and oriented to person, place and time. Speech is normal. Language is fluent with no aphasia. Attention and concentration are normal.    Cranial Nerves  CN II: Visual acuity is normal. Visual fields full to confrontation. CN III, IV, VI: Extraocular movements intact bilaterally. Normal lids and orbits bilaterally. Pupils equal round and reactive to light bilaterally. CN V: Facial sensation is normal.  CN VII: Full and symmetric facial movement. CN VIII: Hearing is normal.  CN IX, X: Palate elevates symmetrically. Normal gag reflex. CN XI: Shoulder shrug strength is normal.  CN XII: Tongue midline without atrophy or fasciculations. Motor  Decreased muscle bulk throughout. No fasciculations present. Normal muscle tone. No abnormal involuntary movements. Strength is 5/5 throughout all four extremities. Sensory  Sensation: Decreased fine touch and pin prick in bilateral lower extremities symmetric up to ankles. Decreased sensation to cold also .      Reflexes                                            Right                      Left  Brachioradialis                    2+                         2+  Biceps                                 2+                         2+  Triceps                                2+                         2+  Finger flex                           2+                         2+  Hamstring                            2+                         2+  Patellar                                2+                         2+  Achilles                                2+ 2+    Coordination    Finger-to-nose, rapid alternating movements and heel-to-shin normal bilaterally without dysmetria. Gait  Normal casual, toe, heel and tandem gait. ASSESSMENT / PLAN:   Tr Gillespie is a 25-year-old  female who presents as a follow-up for bilateral lower extremity neuropathic pain. Past medical history significant for left malleolus ankle fracture 2016, distal lower extremity neuropathic pain, EtOH abuse sober since 2009, anxiety and depression. Patient symptoms have been well controlled nonprogressive since starting gabapentin 200 mg twice daily. Given her elevated copper serum levels and abnormal liver ultrasound recommend evaluation by GI as outpatient especially considering she does not have a PCP. Would also recommend establishing care with PCP. Follow-up in 6 months        Assessment  1. 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 Depression and anxiety well controlled  3. History of ETOH abuse 1/5th daily vodka sober since inpatient rehab 2009      Plan  -liver ultrasound completed and reviewed 2/2/22-  - serum and urine copper, heavy metal screen, ceruloplasmin, zinc , CMP, CBC, b12 and folate, completed and reviewed   - Continue gabapentin 200mg BID (100mg AM and 300mg PM) and lexapro 10mg daily  - Start taking folic acid supplement 1mg daily  -Follow-up in 6 months  -recommend GI evaluation and to establish care with PCP         Ms. Sharla Valadez received counseling on the following healthy behaviors: medical compliance, smoking cessation, blood pressure control, regular follow up with primary doctor.         Electronically signed by Ketty Jimenez MD on 9/15/2022 at 3:27 PM

## 2023-02-17 DIAGNOSIS — G62.9 NEUROPATHY: Primary | ICD-10-CM

## 2023-02-17 DIAGNOSIS — F32.A ANXIETY AND DEPRESSION: ICD-10-CM

## 2023-02-17 DIAGNOSIS — F41.9 ANXIETY AND DEPRESSION: ICD-10-CM

## 2023-02-17 RX ORDER — FOLIC ACID 1 MG/1
1 TABLET ORAL DAILY
Qty: 90 TABLET | Refills: 1 | Status: CANCELLED | OUTPATIENT
Start: 2023-02-17

## 2023-02-21 RX ORDER — GABAPENTIN 100 MG/1
200 CAPSULE ORAL 2 TIMES DAILY
Qty: 360 CAPSULE | Refills: 1 | Status: SHIPPED | OUTPATIENT
Start: 2023-02-21 | End: 2023-05-22

## 2023-02-21 RX ORDER — ESCITALOPRAM OXALATE 10 MG/1
10 TABLET ORAL DAILY
Qty: 90 TABLET | Refills: 1 | Status: SHIPPED | OUTPATIENT
Start: 2023-02-21

## 2023-02-21 NOTE — TELEPHONE ENCOUNTER
Called patient 2/21/23 regarding request for refill of gabapentin 200mg BID, Celexa 34EA daily and folic acid. Discussed with patient no side effects from above medications she has been tolerating well. Did discuss that she can discontinue folic acid as repeat labs were WNL. Refill otherwise sent for 6 month supply of gabapentin and lexapro. Unfortunately she still has not scheduled her GI appoitment and I did recommend she get this scheduled asap regarding her abnormal liver US and copper levels.      Letcher Osler MD  PGY-4 Neurology Resident  [address], [place], [address]

## 2023-07-08 ENCOUNTER — OFFICE VISIT (OUTPATIENT)
Dept: PRIMARY CARE CLINIC | Age: 68
End: 2023-07-08
Payer: MEDICARE

## 2023-07-08 VITALS
WEIGHT: 118 LBS | BODY MASS INDEX: 18.52 KG/M2 | DIASTOLIC BLOOD PRESSURE: 77 MMHG | SYSTOLIC BLOOD PRESSURE: 107 MMHG | HEART RATE: 99 BPM | OXYGEN SATURATION: 97 % | HEIGHT: 67 IN

## 2023-07-08 DIAGNOSIS — L03.114 CELLULITIS OF LEFT UPPER EXTREMITY: Primary | ICD-10-CM

## 2023-07-08 PROCEDURE — 99203 OFFICE O/P NEW LOW 30 MIN: CPT | Performed by: FAMILY MEDICINE

## 2023-07-08 PROCEDURE — 1123F ACP DISCUSS/DSCN MKR DOCD: CPT | Performed by: FAMILY MEDICINE

## 2023-07-08 RX ORDER — CEPHALEXIN 500 MG/1
500 CAPSULE ORAL 4 TIMES DAILY
Qty: 28 CAPSULE | Refills: 0 | Status: SHIPPED | OUTPATIENT
Start: 2023-07-08 | End: 2023-07-15

## 2023-07-08 ASSESSMENT — ENCOUNTER SYMPTOMS
SWOLLEN GLANDS: 0
VOMITING: 0
NAUSEA: 0
SORE THROAT: 0
COUGH: 0
CHANGE IN BOWEL HABIT: 0

## 2023-07-08 ASSESSMENT — PATIENT HEALTH QUESTIONNAIRE - PHQ9
SUM OF ALL RESPONSES TO PHQ QUESTIONS 1-9: 0
SUM OF ALL RESPONSES TO PHQ QUESTIONS 1-9: 0
6. FEELING BAD ABOUT YOURSELF - OR THAT YOU ARE A FAILURE OR HAVE LET YOURSELF OR YOUR FAMILY DOWN: 0
SUM OF ALL RESPONSES TO PHQ QUESTIONS 1-9: 0
7. TROUBLE CONCENTRATING ON THINGS, SUCH AS READING THE NEWSPAPER OR WATCHING TELEVISION: 0
SUM OF ALL RESPONSES TO PHQ9 QUESTIONS 1 & 2: 0
9. THOUGHTS THAT YOU WOULD BE BETTER OFF DEAD, OR OF HURTING YOURSELF: 0
5. POOR APPETITE OR OVEREATING: 0
10. IF YOU CHECKED OFF ANY PROBLEMS, HOW DIFFICULT HAVE THESE PROBLEMS MADE IT FOR YOU TO DO YOUR WORK, TAKE CARE OF THINGS AT HOME, OR GET ALONG WITH OTHER PEOPLE: 0
3. TROUBLE FALLING OR STAYING ASLEEP: 0
2. FEELING DOWN, DEPRESSED OR HOPELESS: 0
4. FEELING TIRED OR HAVING LITTLE ENERGY: 0
SUM OF ALL RESPONSES TO PHQ QUESTIONS 1-9: 0
8. MOVING OR SPEAKING SO SLOWLY THAT OTHER PEOPLE COULD HAVE NOTICED. OR THE OPPOSITE, BEING SO FIGETY OR RESTLESS THAT YOU HAVE BEEN MOVING AROUND A LOT MORE THAN USUAL: 0
1. LITTLE INTEREST OR PLEASURE IN DOING THINGS: 0

## 2023-07-08 NOTE — PATIENT INSTRUCTIONS
Take antibiotic as prescribed for infected bug bite. May take tylenol/motrin as needed for pain.  Try icing to help with swelling  If symptoms worsen or do not improve please follow-up with PCP or return to clinic

## 2023-07-08 NOTE — PROGRESS NOTES
[address] [facility] [place] [address]  [address] [place] [address]  Dept: [phone]  Dept Fax: [phone]    Cleopatra Tafoya is a 69 y.o. female who presents today for her medical conditions/complaintsas noted below. Cleopatra Tafoya is c/o of Insect Bite (Bug bite on left arm that happened Monday. Painful. )        HPI:     Insect Bite  This is a new problem. The current episode started in the past 7 days (5 days). The problem has been unchanged. Pertinent negatives include no change in bowel habit, chills, coughing, fever, nausea, sore throat, swollen glands or vomiting. Nothing aggravates the symptoms. She has tried ice (topical benadryl) for the symptoms. The treatment provided mild relief. Past Medical History:   Diagnosis Date    Anxiety     Depression     Past medical history reviewed and pertinent positives/negatives in the HPI    History reviewed. No pertinent surgical history. History reviewed. No pertinent family history. Social History     Tobacco Use    Smoking status: Every Day     Packs/day: 1.50     Years: 30.00     Pack years: 45.00     Types: Cigarettes     Last attempt to quit: 2014     Years since quittin.5    Smokeless tobacco: Never   Substance Use Topics    Alcohol use: No      Current Outpatient Medications   Medication Sig Dispense Refill    cephALEXin (KEFLEX) 500 MG capsule Take 1 capsule by mouth 4 times daily for 7 days 28 capsule 0    escitalopram (LEXAPRO) 10 MG tablet Take 1 tablet by mouth daily 90 tablet 1    gabapentin (NEURONTIN) 100 MG capsule Take 2 capsules by mouth 2 times daily for 90 days.  317 capsule 1    folic acid (FOLVITE) 1 MG tablet Take 1 tablet by mouth daily (Patient not taking: Reported on 9/15/2022) 90 tablet 1    acyclovir (ZOVIRAX) 200 MG capsule TAKE 2 CAPSULES BY MOUTH FIVE TIMES DAILY (Patient not taking: No sig reported) 50 capsule 2    HYDROcodone-acetaminophen ([address])

## 2023-09-21 ENCOUNTER — OFFICE VISIT (OUTPATIENT)
Dept: NEUROLOGY | Age: 68
End: 2023-09-21

## 2023-09-21 VITALS
HEART RATE: 97 BPM | OXYGEN SATURATION: 98 % | SYSTOLIC BLOOD PRESSURE: 128 MMHG | BODY MASS INDEX: 18.52 KG/M2 | DIASTOLIC BLOOD PRESSURE: 86 MMHG | TEMPERATURE: 97.8 F | HEIGHT: 67 IN | WEIGHT: 118 LBS

## 2023-09-21 DIAGNOSIS — G62.9 NEUROPATHY: ICD-10-CM

## 2023-09-21 DIAGNOSIS — F32.A ANXIETY AND DEPRESSION: ICD-10-CM

## 2023-09-21 DIAGNOSIS — F41.9 ANXIETY AND DEPRESSION: ICD-10-CM

## 2023-09-21 RX ORDER — ESCITALOPRAM OXALATE 10 MG/1
10 TABLET ORAL DAILY
Qty: 90 TABLET | Refills: 1 | Status: SHIPPED | OUTPATIENT
Start: 2023-09-21

## 2023-09-21 RX ORDER — GABAPENTIN 100 MG/1
200 CAPSULE ORAL 2 TIMES DAILY
Qty: 360 CAPSULE | Refills: 1 | Status: SHIPPED | OUTPATIENT
Start: 2023-09-21 | End: 2024-03-19

## 2023-09-21 ASSESSMENT — ENCOUNTER SYMPTOMS
CHEST TIGHTNESS: 0
ABDOMINAL PAIN: 0
APNEA: 0
NAUSEA: 0
COUGH: 0
VOMITING: 0
SHORTNESS OF BREATH: 0
ABDOMINAL DISTENTION: 0
SINUS PAIN: 0
EYE PAIN: 0

## 2023-09-21 ASSESSMENT — VISUAL ACUITY: VA_NORMAL: 1

## 2023-09-21 NOTE — PROGRESS NOTES
Packs/day: 1.50     Years: 30.00     Additional pack years: 0.00     Total pack years: 45.00     Types: Cigarettes     Last attempt to quit: 2014     Years since quittin.7    Smokeless tobacco: Never   Substance and Sexual Activity    Alcohol use: No    Drug use: No    Sexual activity: Never   Other Topics Concern    Not on file   Social History Narrative    Not on file     Social Determinants of Health     Financial Resource Strain: Not on file   Food Insecurity: Not on file   Transportation Needs: Not on file   Physical Activity: Not on file   Stress: Not on file   Social Connections: Not on file   Intimate Partner Violence: Not on file   Housing Stability: Not on file        Current Outpatient Medications   Medication Sig Dispense Refill    gabapentin (NEURONTIN) 100 MG capsule Take 2 capsules by mouth 2 times daily for 180 days. 360 capsule 1    escitalopram (LEXAPRO) 10 MG tablet Take 1 tablet by mouth daily 90 tablet 1    folic acid (FOLVITE) 1 MG tablet Take 1 tablet by mouth daily (Patient not taking: Reported on 9/15/2022) 90 tablet 1    acyclovir (ZOVIRAX) 200 MG capsule TAKE 2 CAPSULES BY MOUTH FIVE TIMES DAILY (Patient not taking: No sig reported) 50 capsule 2    HYDROcodone-acetaminophen (NORCO) 5-325 MG per tablet Take 1 tablet by mouth every 6 hours as needed for Pain (Patient not taking: Reported on 9/15/2022) 20 tablet 0     No current facility-administered medications for this visit. No Known Allergies     REVIEW OF SYSTEMS:     Review of Systems   Constitutional:  Negative for activity change, appetite change, chills and fatigue. HENT:  Negative for ear pain and sinus pain. Eyes:  Negative for pain and visual disturbance. Respiratory:  Negative for apnea, cough, chest tightness and shortness of breath. Cardiovascular:  Negative for chest pain, palpitations and leg swelling. Gastrointestinal:  Negative for abdominal distention, abdominal pain, nausea and vomiting.

## 2024-03-25 DIAGNOSIS — F32.A ANXIETY AND DEPRESSION: ICD-10-CM

## 2024-03-25 DIAGNOSIS — G62.9 NEUROPATHY: ICD-10-CM

## 2024-03-25 DIAGNOSIS — F41.9 ANXIETY AND DEPRESSION: ICD-10-CM

## 2024-03-25 RX ORDER — GABAPENTIN 100 MG/1
200 CAPSULE ORAL 2 TIMES DAILY
Qty: 360 CAPSULE | Refills: 1 | Status: SHIPPED | OUTPATIENT
Start: 2024-03-25 | End: 2024-09-21

## 2024-03-25 RX ORDER — ESCITALOPRAM OXALATE 10 MG/1
10 TABLET ORAL DAILY
Qty: 90 TABLET | Refills: 1 | Status: SHIPPED | OUTPATIENT
Start: 2024-03-25

## 2024-03-25 NOTE — TELEPHONE ENCOUNTER
Patient calling to requesting refill for Gabapentin and Escitalopram. Please review and e-scribe if applicable.     Last Visit Date: 09/26/2024    Next Visit Date:  Future Appointments   Date Time Provider Department Center   9/26/2024  1:00 PM Maxime Hinojosa MD Neuro [facility] Neurology -

## 2024-09-26 ENCOUNTER — OFFICE VISIT (OUTPATIENT)
Dept: NEUROLOGY | Age: 69
End: 2024-09-26
Payer: MEDICARE

## 2024-09-26 VITALS
DIASTOLIC BLOOD PRESSURE: 90 MMHG | WEIGHT: 122 LBS | BODY MASS INDEX: 19.15 KG/M2 | SYSTOLIC BLOOD PRESSURE: 132 MMHG | HEIGHT: 67 IN | HEART RATE: 106 BPM

## 2024-09-26 DIAGNOSIS — F41.9 ANXIETY AND DEPRESSION: ICD-10-CM

## 2024-09-26 DIAGNOSIS — F32.A ANXIETY AND DEPRESSION: ICD-10-CM

## 2024-09-26 DIAGNOSIS — G62.9 NEUROPATHY: Primary | ICD-10-CM

## 2024-09-26 PROCEDURE — 99214 OFFICE O/P EST MOD 30 MIN: CPT

## 2024-09-26 PROCEDURE — 1123F ACP DISCUSS/DSCN MKR DOCD: CPT

## 2024-09-26 RX ORDER — DULOXETIN HYDROCHLORIDE 60 MG/1
60 CAPSULE, DELAYED RELEASE ORAL DAILY
Qty: 90 CAPSULE | Refills: 1 | Status: SHIPPED | OUTPATIENT
Start: 2024-09-26

## 2024-09-26 NOTE — PROGRESS NOTES
(ZOVIRAX) 200 MG capsule TAKE 2 CAPSULES BY MOUTH FIVE TIMES DAILY (Patient not taking: No sig reported) 50 capsule 2    HYDROcodone-acetaminophen (NORCO) 5-325 MG per tablet Take 1 tablet by mouth every 6 hours as needed for Pain (Patient not taking: Reported on 9/15/2022) 20 tablet 0     No current facility-administered medications for this visit.        No Known Allergies     REVIEW OF SYSTEMS:     Review of Systems   Constitutional:  Negative for activity change, appetite change, chills and fatigue.   HENT:  Negative for ear pain and sinus pain.    Eyes:  Negative for pain and visual disturbance.   Respiratory:  Negative for apnea, cough, chest tightness and shortness of breath.    Cardiovascular:  Negative for chest pain, palpitations and leg swelling.   Gastrointestinal:  Negative for abdominal distention, abdominal pain, nausea and vomiting.   Endocrine: Negative for polydipsia and polyuria.   Genitourinary:  Negative for difficulty urinating and dysuria.   Musculoskeletal:  Negative for arthralgias and myalgias.   Skin:  Negative for rash.   Allergic/Immunologic: Negative for environmental allergies.   Neurological:  Negative for dizziness, facial asymmetry and headaches.   Psychiatric/Behavioral:  Negative for agitation and behavioral problems.         VITALS  There were no vitals taken for this visit.       NEUROLOGICAL EXAMINATION:     Neurological Exam  Mental Status  Awake, alert and oriented to person, place and time. Speech is normal. Language is fluent with no aphasia. Attention and concentration are normal.    Cranial Nerves  CN II: Visual acuity is normal. Visual fields full to confrontation.  CN III, IV, VI: Extraocular movements intact bilaterally. Normal lids and orbits bilaterally. Pupils equal round and reactive to light bilaterally.  CN V: Facial sensation is normal.  CN VII: Full and symmetric facial movement.  CN VIII: Hearing is normal.  CN IX, X: Palate elevates symmetrically. Normal gag

## 2024-10-07 ENCOUNTER — APPOINTMENT (OUTPATIENT)
Dept: CT IMAGING | Facility: CLINIC | Age: 69
End: 2024-10-07
Payer: MEDICARE

## 2024-10-07 ENCOUNTER — HOSPITAL ENCOUNTER (EMERGENCY)
Facility: CLINIC | Age: 69
Discharge: HOME | End: 2024-10-07
Attending: EMERGENCY MEDICINE
Payer: MEDICARE

## 2024-10-07 VITALS
RESPIRATION RATE: 17 BRPM | DIASTOLIC BLOOD PRESSURE: 81 MMHG | TEMPERATURE: 98.5 F | BODY MASS INDEX: 19.15 KG/M2 | HEIGHT: 67 IN | WEIGHT: 122 LBS | SYSTOLIC BLOOD PRESSURE: 102 MMHG | OXYGEN SATURATION: 96 % | HEART RATE: 93 BPM

## 2024-10-07 DIAGNOSIS — S00.83XA CONTUSION OF FACE, INITIAL ENCOUNTER: ICD-10-CM

## 2024-10-07 DIAGNOSIS — H11.32 SUBCONJUNCTIVAL BLEED, LEFT: ICD-10-CM

## 2024-10-07 DIAGNOSIS — S09.90XA INJURY OF HEAD, INITIAL ENCOUNTER: Primary | ICD-10-CM

## 2024-10-07 DIAGNOSIS — E86.0 DEHYDRATION: ICD-10-CM

## 2024-10-07 LAB
ALBUMIN SERPL-MCNC: 4 G/DL (ref 3.5–5.2)
ALBUMIN/GLOB SERPL: 1.3 {RATIO} (ref 1–2.5)
ALP SERPL-CCNC: 123 U/L (ref 35–104)
ALT SERPL-CCNC: 31 U/L (ref 5–33)
ANION GAP SERPL CALCULATED.3IONS-SCNC: 21 MMOL/L (ref 9–17)
AST SERPL-CCNC: 57 U/L
BASOPHILS # BLD: 0 K/UL (ref 0–0.2)
BASOPHILS NFR BLD: 0 % (ref 0–2)
BILIRUB SERPL-MCNC: 0.8 MG/DL (ref 0.3–1.2)
BUN SERPL-MCNC: 22 MG/DL (ref 8–23)
CALCIUM SERPL-MCNC: 9.6 MG/DL (ref 8.6–10.4)
CHLORIDE SERPL-SCNC: 95 MMOL/L (ref 98–107)
CO2 SERPL-SCNC: 17 MMOL/L (ref 20–31)
CREAT SERPL-MCNC: 0.6 MG/DL (ref 0.5–0.9)
EOSINOPHIL # BLD: 0 K/UL (ref 0–0.4)
EOSINOPHILS RELATIVE PERCENT: 0 % (ref 1–4)
ERYTHROCYTE [DISTWIDTH] IN BLOOD BY AUTOMATED COUNT: 16.5 % (ref 12.5–15.4)
ETHANOL PERCENT: <0.01 %
ETHANOLAMINE SERPL-MCNC: <10 MG/DL (ref 0–0.08)
GFR, ESTIMATED: >90 ML/MIN/1.73M2
GLUCOSE SERPL-MCNC: 127 MG/DL (ref 70–99)
HCT VFR BLD AUTO: 48.5 % (ref 36–46)
HGB BLD-MCNC: 16.6 G/DL (ref 12–16)
INR PPP: 1
LIPASE SERPL-CCNC: 79 U/L (ref 13–60)
LYMPHOCYTES NFR BLD: 0.7 K/UL (ref 1–4.8)
LYMPHOCYTES RELATIVE PERCENT: 6 % (ref 24–44)
MCH RBC QN AUTO: 38.3 PG (ref 26–34)
MCHC RBC AUTO-ENTMCNC: 34.3 G/DL (ref 31–37)
MCV RBC AUTO: 111.8 FL (ref 80–100)
MONOCYTES NFR BLD: 0.58 K/UL (ref 0.1–1.2)
MONOCYTES NFR BLD: 5 % (ref 2–11)
MORPHOLOGY: ABNORMAL
NEUTROPHILS NFR BLD: 89 % (ref 36–66)
NEUTS SEG NFR BLD: 10.32 K/UL (ref 1.8–7.7)
PARTIAL THROMBOPLASTIN TIME: <20 SEC (ref 21.3–31.3)
PLATELET # BLD AUTO: ABNORMAL K/UL (ref 140–450)
PMV BLD AUTO: 7.7 FL (ref 6–12)
POTASSIUM SERPL-SCNC: 3.3 MMOL/L (ref 3.7–5.3)
PROT SERPL-MCNC: 7.2 G/DL (ref 6.4–8.3)
PROTHROMBIN TIME: 10.6 SEC (ref 9.4–12.6)
RBC # BLD AUTO: 4.34 M/UL (ref 4–5.2)
SODIUM SERPL-SCNC: 133 MMOL/L (ref 135–144)
WBC OTHER # BLD: 11.6 K/UL (ref 3.5–11)

## 2024-10-07 PROCEDURE — 99284 EMERGENCY DEPT VISIT MOD MDM: CPT

## 2024-10-07 PROCEDURE — G0480 DRUG TEST DEF 1-7 CLASSES: HCPCS

## 2024-10-07 PROCEDURE — 36415 COLL VENOUS BLD VENIPUNCTURE: CPT

## 2024-10-07 PROCEDURE — 85610 PROTHROMBIN TIME: CPT

## 2024-10-07 PROCEDURE — 85730 THROMBOPLASTIN TIME PARTIAL: CPT

## 2024-10-07 PROCEDURE — 83690 ASSAY OF LIPASE: CPT

## 2024-10-07 PROCEDURE — 85025 COMPLETE CBC W/AUTO DIFF WBC: CPT

## 2024-10-07 PROCEDURE — 70450 CT HEAD/BRAIN W/O DYE: CPT

## 2024-10-07 PROCEDURE — 80053 COMPREHEN METABOLIC PANEL: CPT

## 2024-10-07 PROCEDURE — 70486 CT MAXILLOFACIAL W/O DYE: CPT

## 2024-10-07 RX ORDER — ONDANSETRON 4 MG/1
4 TABLET, ORALLY DISINTEGRATING ORAL 3 TIMES DAILY PRN
Qty: 21 TABLET | Refills: 0 | Status: SHIPPED | OUTPATIENT
Start: 2024-10-07

## 2024-10-07 ASSESSMENT — ENCOUNTER SYMPTOMS
DIARRHEA: 0
NAUSEA: 1
VOMITING: 1
BACK PAIN: 0
ABDOMINAL PAIN: 0
SORE THROAT: 0
SHORTNESS OF BREATH: 0
EYE REDNESS: 1
COUGH: 0

## 2024-10-07 ASSESSMENT — PAIN - FUNCTIONAL ASSESSMENT: PAIN_FUNCTIONAL_ASSESSMENT: WONG-BAKER FACES

## 2024-10-07 ASSESSMENT — PAIN SCALES - WONG BAKER: WONGBAKER_NUMERICALRESPONSE: HURTS A LITTLE BIT

## 2024-10-07 NOTE — ED PROVIDER NOTES
[facility] ED      Pt Name: Modesta Tom  MRN: [id number]  Birthdate 1955  Date of evaluation: 10/7/2024    EMERGENCY DEPARTMENT ENCOUNTER      PERTINENT ATTENDING PHYSICIAN COMMENTS:      Faculty Attestation    I performed a history and physical examination of the patient and discussed management with the mid level provideer. I reviewed the mid level provider's note and agree with the documented findings and plan of care.Any areas of disagreement are noted on the chart. I was personally present for the key portions of any procedures. I have documented in the chart those procedures where I was not present during the key portions. I have reviewed the emergency nurses triage note. I agree with the chief complaint, past medical history, past surgical history, allergies, medications, social and family history as documented unless otherwise noted below. Documentation of the HPI, Physical Exam and Medical Decision Making performed by medical students or scribes is based on my personal performance of the HPI, PE and MDM. For Residents/Physician Assistant/ Nurse Practitioner cases/documentation I have personally evaluated this patient and have completed at least one if not all key elements of the E/M (history, physical exam, and MDM). Additional findings are as noted.    CHIEF COMPLAINT       Chief Complaint   Patient presents with    Fall     Pt fell Monday afternoon. Pt states no anticoagulants. Pt has head pain and bruised left eye with redness. No LOC       HISTORY OF PRESENT ILLNESS    Modesta Tom is a 68 y.o. female who presents to the emergency department following a fall that occurred last week.  She slipped on her kitchen floor striking her head.  She is not on any anticoagulation.  Questionable history of alcohol abuse.  She denies any headache blurry vision or double vision.  She denies any neck pain.  No chest pain or shortness of breath.  Denies any other relevant symptoms.    PAST MEDICAL 
Last OV 11/2/21  
recognition program.  Efforts were made to edit the dictations but occasionally words are mis-transcribed.)    KLAUDIA Ware CNP (electronically signed)           Angela Parker APRN - CNP  10/07/24 1148

## 2024-10-07 NOTE — DISCHARGE INSTRUCTIONS
Continue with ice pack therapy as tolerated to help with the swelling.  Tylenol over-the-counter as directed to help with pain.    Zofran as prescribed to help with nausea or vomiting.    Return to the ER: Fevers, continued or worsening headache, vision changes, continued vomiting, chest pain, breathing difficulty, abdominal pain, weakness, confusion; or any other concerning symptoms.

## 2024-12-06 ENCOUNTER — HOSPITAL ENCOUNTER (INPATIENT)
Age: 69
LOS: 7 days | Discharge: NURSING, REHAB OR LONG-TERM CARE | DRG: 642 | End: 2024-12-13
Attending: STUDENT IN AN ORGANIZED HEALTH CARE EDUCATION/TRAINING PROGRAM | Admitting: INTERNAL MEDICINE
Payer: MEDICARE

## 2024-12-06 ENCOUNTER — APPOINTMENT (OUTPATIENT)
Dept: CT IMAGING | Facility: CLINIC | Age: 69
DRG: 642 | End: 2024-12-06
Payer: MEDICARE

## 2024-12-06 ENCOUNTER — DIRECT ADMIT ORDERS (OUTPATIENT)
Dept: INTERNAL MEDICINE CLINIC | Age: 69
End: 2024-12-06

## 2024-12-06 DIAGNOSIS — Z78.9 UNABLE TO CARE FOR SELF: ICD-10-CM

## 2024-12-06 DIAGNOSIS — E87.6 HYPOKALEMIA: Primary | ICD-10-CM

## 2024-12-06 DIAGNOSIS — R53.1 GENERAL WEAKNESS: ICD-10-CM

## 2024-12-06 DIAGNOSIS — F10.10 ALCOHOL ABUSE: ICD-10-CM

## 2024-12-06 LAB
ALBUMIN SERPL-MCNC: 4 G/DL (ref 3.5–5.2)
ALBUMIN/GLOB SERPL: 1.4 {RATIO} (ref 1–2.5)
ALP SERPL-CCNC: 120 U/L (ref 35–104)
ALT SERPL-CCNC: 23 U/L (ref 5–33)
ANION GAP SERPL CALCULATED.3IONS-SCNC: 16 MMOL/L (ref 9–17)
AST SERPL-CCNC: 47 U/L
BASOPHILS # BLD: 0 K/UL (ref 0–0.2)
BASOPHILS NFR BLD: 0 % (ref 0–2)
BILIRUB SERPL-MCNC: 0.7 MG/DL (ref 0.3–1.2)
BUN SERPL-MCNC: 30 MG/DL (ref 8–23)
CALCIUM SERPL-MCNC: 9.8 MG/DL (ref 8.6–10.4)
CHLORIDE SERPL-SCNC: 95 MMOL/L (ref 98–107)
CO2 SERPL-SCNC: 21 MMOL/L (ref 20–31)
CREAT SERPL-MCNC: 0.7 MG/DL (ref 0.5–0.9)
EOSINOPHIL # BLD: 0 K/UL (ref 0–0.4)
EOSINOPHILS RELATIVE PERCENT: 0 % (ref 1–4)
ERYTHROCYTE [DISTWIDTH] IN BLOOD BY AUTOMATED COUNT: 18.6 % (ref 12.5–15.4)
ETHANOL PERCENT: <0.01 %
ETHANOLAMINE SERPL-MCNC: <10 MG/DL (ref 0–0.08)
GFR, ESTIMATED: >90 ML/MIN/1.73M2
GLUCOSE SERPL-MCNC: 138 MG/DL (ref 70–99)
HCT VFR BLD AUTO: 39.9 % (ref 36–46)
HGB BLD-MCNC: 13.6 G/DL (ref 12–16)
LIPASE SERPL-CCNC: 56 U/L (ref 13–60)
LYMPHOCYTES NFR BLD: 1.38 K/UL (ref 1–4.8)
LYMPHOCYTES RELATIVE PERCENT: 13 % (ref 24–44)
MAGNESIUM SERPL-MCNC: 1.9 MG/DL (ref 1.6–2.6)
MCH RBC QN AUTO: 37.7 PG (ref 26–34)
MCHC RBC AUTO-ENTMCNC: 34 G/DL (ref 31–37)
MCV RBC AUTO: 110.9 FL (ref 80–100)
MONOCYTES NFR BLD: 0.64 K/UL (ref 0.1–1.2)
MONOCYTES NFR BLD: 6 % (ref 2–11)
MORPHOLOGY: ABNORMAL
MORPHOLOGY: ABNORMAL
NEUTROPHILS NFR BLD: 81 % (ref 36–66)
NEUTS SEG NFR BLD: 8.58 K/UL (ref 1.8–7.7)
PLATELET # BLD AUTO: 294 K/UL (ref 140–450)
PMV BLD AUTO: 7.6 FL (ref 6–12)
POTASSIUM SERPL-SCNC: 2.8 MMOL/L (ref 3.7–5.3)
PROT SERPL-MCNC: 6.9 G/DL (ref 6.4–8.3)
RBC # BLD AUTO: 3.6 M/UL (ref 4–5.2)
SODIUM SERPL-SCNC: 132 MMOL/L (ref 135–144)
WBC OTHER # BLD: 10.6 K/UL (ref 3.5–11)

## 2024-12-06 PROCEDURE — 6370000000 HC RX 637 (ALT 250 FOR IP): Performed by: NURSE PRACTITIONER

## 2024-12-06 PROCEDURE — 99285 EMERGENCY DEPT VISIT HI MDM: CPT

## 2024-12-06 PROCEDURE — 83735 ASSAY OF MAGNESIUM: CPT

## 2024-12-06 PROCEDURE — 96365 THER/PROPH/DIAG IV INF INIT: CPT

## 2024-12-06 PROCEDURE — 81001 URINALYSIS AUTO W/SCOPE: CPT

## 2024-12-06 PROCEDURE — 83690 ASSAY OF LIPASE: CPT

## 2024-12-06 PROCEDURE — 6360000002 HC RX W HCPCS: Performed by: NURSE PRACTITIONER

## 2024-12-06 PROCEDURE — 70450 CT HEAD/BRAIN W/O DYE: CPT

## 2024-12-06 PROCEDURE — 2060000000 HC ICU INTERMEDIATE R&B

## 2024-12-06 PROCEDURE — 80053 COMPREHEN METABOLIC PANEL: CPT

## 2024-12-06 PROCEDURE — 2580000003 HC RX 258: Performed by: NURSE PRACTITIONER

## 2024-12-06 PROCEDURE — G0480 DRUG TEST DEF 1-7 CLASSES: HCPCS

## 2024-12-06 PROCEDURE — 85025 COMPLETE CBC W/AUTO DIFF WBC: CPT

## 2024-12-06 PROCEDURE — 72131 CT LUMBAR SPINE W/O DYE: CPT

## 2024-12-06 PROCEDURE — 96361 HYDRATE IV INFUSION ADD-ON: CPT

## 2024-12-06 RX ORDER — SODIUM CHLORIDE 0.9 % (FLUSH) 0.9 %
10 SYRINGE (ML) INJECTION PRN
Status: CANCELLED | OUTPATIENT
Start: 2024-12-06

## 2024-12-06 RX ORDER — 0.9 % SODIUM CHLORIDE 0.9 %
1000 INTRAVENOUS SOLUTION INTRAVENOUS ONCE
Status: COMPLETED | OUTPATIENT
Start: 2024-12-06 | End: 2024-12-06

## 2024-12-06 RX ORDER — LORAZEPAM 2 MG/ML
3 INJECTION INTRAMUSCULAR
Status: CANCELLED | OUTPATIENT
Start: 2024-12-06

## 2024-12-06 RX ORDER — LORAZEPAM 0.5 MG/1
1 TABLET ORAL
Status: CANCELLED | OUTPATIENT
Start: 2024-12-06

## 2024-12-06 RX ORDER — ONDANSETRON 4 MG/1
4 TABLET, ORALLY DISINTEGRATING ORAL EVERY 8 HOURS PRN
Status: CANCELLED | OUTPATIENT
Start: 2024-12-06

## 2024-12-06 RX ORDER — LORAZEPAM 2 MG/ML
4 INJECTION INTRAMUSCULAR
Status: CANCELLED | OUTPATIENT
Start: 2024-12-06

## 2024-12-06 RX ORDER — POTASSIUM CHLORIDE 1500 MG/1
40 TABLET, EXTENDED RELEASE ORAL PRN
Status: CANCELLED | OUTPATIENT
Start: 2024-12-06

## 2024-12-06 RX ORDER — LORAZEPAM 2 MG/ML
2 INJECTION INTRAMUSCULAR
Status: CANCELLED | OUTPATIENT
Start: 2024-12-06

## 2024-12-06 RX ORDER — POTASSIUM CHLORIDE 7.45 MG/ML
10 INJECTION INTRAVENOUS PRN
Status: CANCELLED | OUTPATIENT
Start: 2024-12-06

## 2024-12-06 RX ORDER — ONDANSETRON 2 MG/ML
4 INJECTION INTRAMUSCULAR; INTRAVENOUS EVERY 6 HOURS PRN
Status: CANCELLED | OUTPATIENT
Start: 2024-12-06

## 2024-12-06 RX ORDER — LANOLIN ALCOHOL/MO/W.PET/CERES
100 CREAM (GRAM) TOPICAL DAILY
Status: CANCELLED | OUTPATIENT
Start: 2024-12-07

## 2024-12-06 RX ORDER — POLYETHYLENE GLYCOL 3350 17 G/17G
17 POWDER, FOR SOLUTION ORAL DAILY PRN
Status: CANCELLED | OUTPATIENT
Start: 2024-12-06

## 2024-12-06 RX ORDER — ACETAMINOPHEN 325 MG/1
650 TABLET ORAL EVERY 6 HOURS PRN
Status: CANCELLED | OUTPATIENT
Start: 2024-12-06

## 2024-12-06 RX ORDER — LORAZEPAM 0.5 MG/1
3 TABLET ORAL
Status: CANCELLED | OUTPATIENT
Start: 2024-12-06

## 2024-12-06 RX ORDER — POTASSIUM CHLORIDE 7.45 MG/ML
10 INJECTION INTRAVENOUS ONCE
Status: COMPLETED | OUTPATIENT
Start: 2024-12-06 | End: 2024-12-06

## 2024-12-06 RX ORDER — SODIUM CHLORIDE 9 MG/ML
INJECTION, SOLUTION INTRAVENOUS CONTINUOUS
Status: CANCELLED | OUTPATIENT
Start: 2024-12-06 | End: 2024-12-07

## 2024-12-06 RX ORDER — SODIUM CHLORIDE 9 MG/ML
INJECTION, SOLUTION INTRAVENOUS PRN
Status: CANCELLED | OUTPATIENT
Start: 2024-12-06

## 2024-12-06 RX ORDER — LORAZEPAM 0.5 MG/1
4 TABLET ORAL
Status: CANCELLED | OUTPATIENT
Start: 2024-12-06

## 2024-12-06 RX ORDER — LORAZEPAM 2 MG/ML
1 INJECTION INTRAMUSCULAR
Status: CANCELLED | OUTPATIENT
Start: 2024-12-06

## 2024-12-06 RX ORDER — LORAZEPAM 0.5 MG/1
2 TABLET ORAL
Status: CANCELLED | OUTPATIENT
Start: 2024-12-06

## 2024-12-06 RX ORDER — ENOXAPARIN SODIUM 100 MG/ML
40 INJECTION SUBCUTANEOUS DAILY
Status: CANCELLED | OUTPATIENT
Start: 2024-12-07

## 2024-12-06 RX ORDER — SODIUM CHLORIDE 0.9 % (FLUSH) 0.9 %
5-40 SYRINGE (ML) INJECTION EVERY 12 HOURS SCHEDULED
Status: CANCELLED | OUTPATIENT
Start: 2024-12-06

## 2024-12-06 RX ADMIN — SODIUM CHLORIDE 1000 ML: 9 INJECTION, SOLUTION INTRAVENOUS at 18:50

## 2024-12-06 RX ADMIN — POTASSIUM CHLORIDE 10 MEQ: 7.46 INJECTION, SOLUTION INTRAVENOUS at 19:45

## 2024-12-06 RX ADMIN — POTASSIUM BICARBONATE 40 MEQ: 782 TABLET, EFFERVESCENT ORAL at 19:43

## 2024-12-06 ASSESSMENT — PAIN - FUNCTIONAL ASSESSMENT: PAIN_FUNCTIONAL_ASSESSMENT: NONE - DENIES PAIN

## 2024-12-07 ENCOUNTER — APPOINTMENT (OUTPATIENT)
Dept: CT IMAGING | Age: 69
DRG: 642 | End: 2024-12-07
Payer: MEDICARE

## 2024-12-07 PROBLEM — E87.1 HYPONATREMIA: Status: ACTIVE | Noted: 2024-12-07

## 2024-12-07 PROBLEM — F10.10 ALCOHOL ABUSE: Status: ACTIVE | Noted: 2024-12-07

## 2024-12-07 LAB
AFP SERPL-MCNC: 21.9 UG/L
ALBUMIN SERPL-MCNC: 3.3 G/DL (ref 3.5–5.2)
ALBUMIN/GLOB SERPL: 1.6 {RATIO} (ref 1–2.5)
ALP SERPL-CCNC: 96 U/L (ref 35–104)
ALT SERPL-CCNC: 20 U/L (ref 10–35)
AMMONIA PLAS-SCNC: 50 UMOL/L (ref 11–51)
ANION GAP SERPL CALCULATED.3IONS-SCNC: 17 MMOL/L (ref 9–16)
AST SERPL-CCNC: 41 U/L (ref 10–35)
BACTERIA URNS QL MICRO: ABNORMAL
BASOPHILS # BLD: 0.04 K/UL (ref 0–0.2)
BASOPHILS NFR BLD: 1 % (ref 0–2)
BILIRUB SERPL-MCNC: 0.5 MG/DL (ref 0–1.2)
BILIRUB UR QL STRIP: ABNORMAL
BUN SERPL-MCNC: 26 MG/DL (ref 8–23)
CA-I BLD-SCNC: 1.18 MMOL/L (ref 1.15–1.33)
CALCIUM SERPL-MCNC: 8.7 MG/DL (ref 8.8–10.2)
CANCER AG19-9 SERPL IA-ACNC: 16 U/ML (ref 0–35)
CASTS #/AREA URNS LPF: ABNORMAL /LPF (ref 0–2)
CASTS #/AREA URNS LPF: ABNORMAL /LPF (ref 0–2)
CEA SERPL-MCNC: 4 NG/ML (ref 0–3.8)
CHARACTER UR: ABNORMAL
CHLORIDE SERPL-SCNC: 100 MMOL/L (ref 98–107)
CLARITY UR: ABNORMAL
CO2 SERPL-SCNC: 18 MMOL/L (ref 20–31)
COLOR UR: ABNORMAL
CREAT SERPL-MCNC: 0.5 MG/DL (ref 0.5–0.9)
EOSINOPHIL # BLD: 0.08 K/UL (ref 0–0.44)
EOSINOPHILS RELATIVE PERCENT: 1 % (ref 1–4)
EPI CELLS #/AREA URNS HPF: ABNORMAL /HPF (ref 0–5)
ERYTHROCYTE [DISTWIDTH] IN BLOOD BY AUTOMATED COUNT: 15.9 % (ref 11.8–14.4)
EST. AVERAGE GLUCOSE BLD GHB EST-MCNC: 97 MG/DL
FOLATE SERPL-MCNC: 2.4 NG/ML (ref 4.8–24.2)
GFR, ESTIMATED: >90 ML/MIN/1.73M2
GLUCOSE SERPL-MCNC: 96 MG/DL (ref 82–115)
GLUCOSE UR STRIP-MCNC: NEGATIVE MG/DL
HBA1C MFR BLD: 5 % (ref 4–6)
HCT VFR BLD AUTO: 32.1 % (ref 36.3–47.1)
HGB BLD-MCNC: 11.6 G/DL (ref 11.9–15.1)
HGB UR QL STRIP.AUTO: NEGATIVE
IMM GRANULOCYTES # BLD AUTO: 0.04 K/UL (ref 0–0.3)
IMM GRANULOCYTES NFR BLD: 1 %
INR PPP: 1
IRON SATN MFR SERPL: 74 % (ref 20–55)
IRON SERPL-MCNC: 125 UG/DL (ref 37–145)
KETONES UR STRIP-MCNC: ABNORMAL MG/DL
LEUKOCYTE ESTERASE UR QL STRIP: ABNORMAL
LIPASE SERPL-CCNC: 57 U/L (ref 13–60)
LYMPHOCYTES NFR BLD: 1.97 K/UL (ref 1.1–3.7)
LYMPHOCYTES RELATIVE PERCENT: 24 % (ref 24–43)
MAGNESIUM SERPL-MCNC: 1.9 MG/DL (ref 1.6–2.4)
MCH RBC QN AUTO: 38.5 PG (ref 25.2–33.5)
MCHC RBC AUTO-ENTMCNC: 36.1 G/DL (ref 28.4–34.8)
MCV RBC AUTO: 106.6 FL (ref 82.6–102.9)
MONOCYTES NFR BLD: 0.45 K/UL (ref 0.1–1.2)
MONOCYTES NFR BLD: 6 % (ref 3–12)
MUCOUS THREADS URNS QL MICRO: ABNORMAL
NEUTROPHILS NFR BLD: 68 % (ref 36–65)
NEUTS SEG NFR BLD: 5.53 K/UL (ref 1.5–8.1)
NITRITE UR QL STRIP: NEGATIVE
NRBC BLD-RTO: 0 PER 100 WBC
PH UR STRIP: 6 [PH] (ref 5–8)
PHOSPHATE SERPL-MCNC: 1.9 MG/DL (ref 2.5–4.5)
PLATELET # BLD AUTO: 215 K/UL (ref 138–453)
PMV BLD AUTO: 9.9 FL (ref 8.1–13.5)
POTASSIUM SERPL-SCNC: 3.3 MMOL/L (ref 3.7–5.3)
PROT SERPL-MCNC: 5.4 G/DL (ref 6.6–8.7)
PROT UR STRIP-MCNC: ABNORMAL MG/DL
PROTHROMBIN TIME: 13.2 SEC (ref 11.5–14.2)
RBC # BLD AUTO: 3.01 M/UL (ref 3.95–5.11)
RBC # BLD: ABNORMAL 10*6/UL
RBC # BLD: ABNORMAL 10*6/UL
RBC #/AREA URNS HPF: ABNORMAL /HPF (ref 0–2)
SODIUM SERPL-SCNC: 135 MMOL/L (ref 136–145)
SP GR UR STRIP: 1.02 (ref 1–1.03)
TIBC SERPL-MCNC: 169 UG/DL (ref 250–450)
TSH SERPL DL<=0.05 MIU/L-ACNC: 2.16 UIU/ML (ref 0.27–4.2)
UNSATURATED IRON BINDING CAPACITY: 44 UG/DL (ref 112–347)
UROBILINOGEN UR STRIP-ACNC: NORMAL EU/DL (ref 0–1)
VIT B12 SERPL-MCNC: 581 PG/ML (ref 232–1245)
WBC #/AREA URNS HPF: ABNORMAL /HPF (ref 0–5)
WBC OTHER # BLD: 8.1 K/UL (ref 3.5–11.3)

## 2024-12-07 PROCEDURE — 80053 COMPREHEN METABOLIC PANEL: CPT

## 2024-12-07 PROCEDURE — 85025 COMPLETE CBC W/AUTO DIFF WBC: CPT

## 2024-12-07 PROCEDURE — 82378 CARCINOEMBRYONIC ANTIGEN: CPT

## 2024-12-07 PROCEDURE — 2060000000 HC ICU INTERMEDIATE R&B

## 2024-12-07 PROCEDURE — 84100 ASSAY OF PHOSPHORUS: CPT

## 2024-12-07 PROCEDURE — 83540 ASSAY OF IRON: CPT

## 2024-12-07 PROCEDURE — 2580000003 HC RX 258: Performed by: STUDENT IN AN ORGANIZED HEALTH CARE EDUCATION/TRAINING PROGRAM

## 2024-12-07 PROCEDURE — 2580000003 HC RX 258: Performed by: NURSE PRACTITIONER

## 2024-12-07 PROCEDURE — 74176 CT ABD & PELVIS W/O CONTRAST: CPT

## 2024-12-07 PROCEDURE — 83735 ASSAY OF MAGNESIUM: CPT

## 2024-12-07 PROCEDURE — 99221 1ST HOSP IP/OBS SF/LOW 40: CPT | Performed by: STUDENT IN AN ORGANIZED HEALTH CARE EDUCATION/TRAINING PROGRAM

## 2024-12-07 PROCEDURE — 83036 HEMOGLOBIN GLYCOSYLATED A1C: CPT

## 2024-12-07 PROCEDURE — 2500000003 HC RX 250 WO HCPCS: Performed by: STUDENT IN AN ORGANIZED HEALTH CARE EDUCATION/TRAINING PROGRAM

## 2024-12-07 PROCEDURE — 6360000002 HC RX W HCPCS: Performed by: NURSE PRACTITIONER

## 2024-12-07 PROCEDURE — 83550 IRON BINDING TEST: CPT

## 2024-12-07 PROCEDURE — APPSS45 APP SPLIT SHARED TIME 31-45 MINUTES

## 2024-12-07 PROCEDURE — 36415 COLL VENOUS BLD VENIPUNCTURE: CPT

## 2024-12-07 PROCEDURE — 83690 ASSAY OF LIPASE: CPT

## 2024-12-07 PROCEDURE — 6370000000 HC RX 637 (ALT 250 FOR IP): Performed by: NURSE PRACTITIONER

## 2024-12-07 PROCEDURE — 85610 PROTHROMBIN TIME: CPT

## 2024-12-07 PROCEDURE — 6360000002 HC RX W HCPCS: Performed by: STUDENT IN AN ORGANIZED HEALTH CARE EDUCATION/TRAINING PROGRAM

## 2024-12-07 PROCEDURE — 82140 ASSAY OF AMMONIA: CPT

## 2024-12-07 PROCEDURE — 82105 ALPHA-FETOPROTEIN SERUM: CPT

## 2024-12-07 PROCEDURE — 82330 ASSAY OF CALCIUM: CPT

## 2024-12-07 PROCEDURE — 82746 ASSAY OF FOLIC ACID SERUM: CPT

## 2024-12-07 PROCEDURE — 84443 ASSAY THYROID STIM HORMONE: CPT

## 2024-12-07 PROCEDURE — 6370000000 HC RX 637 (ALT 250 FOR IP)

## 2024-12-07 PROCEDURE — 86301 IMMUNOASSAY TUMOR CA 19-9: CPT

## 2024-12-07 PROCEDURE — 82607 VITAMIN B-12: CPT

## 2024-12-07 RX ORDER — LORAZEPAM 1 MG/1
4 TABLET ORAL
Status: DISCONTINUED | OUTPATIENT
Start: 2024-12-07 | End: 2024-12-07

## 2024-12-07 RX ORDER — GABAPENTIN 100 MG/1
200 CAPSULE ORAL 2 TIMES DAILY
Status: DISCONTINUED | OUTPATIENT
Start: 2024-12-07 | End: 2024-12-07

## 2024-12-07 RX ORDER — ONDANSETRON 4 MG/1
4 TABLET, ORALLY DISINTEGRATING ORAL EVERY 8 HOURS PRN
Status: DISCONTINUED | OUTPATIENT
Start: 2024-12-07 | End: 2024-12-13 | Stop reason: HOSPADM

## 2024-12-07 RX ORDER — ONDANSETRON 2 MG/ML
4 INJECTION INTRAMUSCULAR; INTRAVENOUS EVERY 6 HOURS PRN
Status: DISCONTINUED | OUTPATIENT
Start: 2024-12-07 | End: 2024-12-13 | Stop reason: HOSPADM

## 2024-12-07 RX ORDER — LORAZEPAM 1 MG/1
2 TABLET ORAL
Status: DISCONTINUED | OUTPATIENT
Start: 2024-12-07 | End: 2024-12-13 | Stop reason: HOSPADM

## 2024-12-07 RX ORDER — FOLIC ACID 1 MG/1
1 TABLET ORAL DAILY
Status: DISCONTINUED | OUTPATIENT
Start: 2024-12-07 | End: 2024-12-13 | Stop reason: HOSPADM

## 2024-12-07 RX ORDER — ACETAMINOPHEN 325 MG/1
650 TABLET ORAL EVERY 6 HOURS PRN
Status: DISCONTINUED | OUTPATIENT
Start: 2024-12-07 | End: 2024-12-13 | Stop reason: HOSPADM

## 2024-12-07 RX ORDER — DULOXETIN HYDROCHLORIDE 60 MG/1
60 CAPSULE, DELAYED RELEASE ORAL DAILY
Status: DISCONTINUED | OUTPATIENT
Start: 2024-12-07 | End: 2024-12-13 | Stop reason: HOSPADM

## 2024-12-07 RX ORDER — MAGNESIUM SULFATE IN WATER 40 MG/ML
2000 INJECTION, SOLUTION INTRAVENOUS ONCE
Status: COMPLETED | OUTPATIENT
Start: 2024-12-07 | End: 2024-12-07

## 2024-12-07 RX ORDER — SODIUM CHLORIDE 0.9 % (FLUSH) 0.9 %
10 SYRINGE (ML) INJECTION PRN
Status: DISCONTINUED | OUTPATIENT
Start: 2024-12-07 | End: 2024-12-13 | Stop reason: HOSPADM

## 2024-12-07 RX ORDER — SODIUM CHLORIDE 9 MG/ML
INJECTION, SOLUTION INTRAVENOUS PRN
Status: DISCONTINUED | OUTPATIENT
Start: 2024-12-07 | End: 2024-12-13 | Stop reason: HOSPADM

## 2024-12-07 RX ORDER — MAGNESIUM SULFATE 1 G/100ML
1000 INJECTION INTRAVENOUS PRN
Status: DISCONTINUED | OUTPATIENT
Start: 2024-12-07 | End: 2024-12-13 | Stop reason: HOSPADM

## 2024-12-07 RX ORDER — POTASSIUM CHLORIDE 1500 MG/1
40 TABLET, EXTENDED RELEASE ORAL PRN
Status: DISCONTINUED | OUTPATIENT
Start: 2024-12-07 | End: 2024-12-13 | Stop reason: HOSPADM

## 2024-12-07 RX ORDER — LORAZEPAM 2 MG/ML
2 INJECTION INTRAMUSCULAR
Status: DISCONTINUED | OUTPATIENT
Start: 2024-12-07 | End: 2024-12-13 | Stop reason: HOSPADM

## 2024-12-07 RX ORDER — POLYETHYLENE GLYCOL 3350 17 G/17G
17 POWDER, FOR SOLUTION ORAL DAILY PRN
Status: DISCONTINUED | OUTPATIENT
Start: 2024-12-07 | End: 2024-12-13 | Stop reason: HOSPADM

## 2024-12-07 RX ORDER — LORAZEPAM 2 MG/ML
1 INJECTION INTRAMUSCULAR
Status: DISCONTINUED | OUTPATIENT
Start: 2024-12-07 | End: 2024-12-13 | Stop reason: HOSPADM

## 2024-12-07 RX ORDER — POTASSIUM CHLORIDE 7.45 MG/ML
10 INJECTION INTRAVENOUS PRN
Status: DISCONTINUED | OUTPATIENT
Start: 2024-12-07 | End: 2024-12-13 | Stop reason: HOSPADM

## 2024-12-07 RX ORDER — ENOXAPARIN SODIUM 100 MG/ML
40 INJECTION SUBCUTANEOUS DAILY
Status: DISCONTINUED | OUTPATIENT
Start: 2024-12-07 | End: 2024-12-08

## 2024-12-07 RX ORDER — LORAZEPAM 1 MG/1
1 TABLET ORAL
Status: DISCONTINUED | OUTPATIENT
Start: 2024-12-07 | End: 2024-12-13 | Stop reason: HOSPADM

## 2024-12-07 RX ORDER — GAUZE BANDAGE 2" X 2"
100 BANDAGE TOPICAL DAILY
Status: DISCONTINUED | OUTPATIENT
Start: 2024-12-07 | End: 2024-12-13 | Stop reason: HOSPADM

## 2024-12-07 RX ORDER — IBUPROFEN 200 MG
200 TABLET ORAL EVERY 6 HOURS PRN
Status: ON HOLD | COMMUNITY
End: 2024-12-10 | Stop reason: HOSPADM

## 2024-12-07 RX ORDER — LORAZEPAM 1 MG/1
3 TABLET ORAL
Status: DISCONTINUED | OUTPATIENT
Start: 2024-12-07 | End: 2024-12-07

## 2024-12-07 RX ORDER — SODIUM CHLORIDE 0.9 % (FLUSH) 0.9 %
5-40 SYRINGE (ML) INJECTION EVERY 12 HOURS SCHEDULED
Status: DISCONTINUED | OUTPATIENT
Start: 2024-12-07 | End: 2024-12-13 | Stop reason: HOSPADM

## 2024-12-07 RX ORDER — ACETAMINOPHEN 650 MG/1
650 SUPPOSITORY RECTAL EVERY 6 HOURS PRN
Status: DISCONTINUED | OUTPATIENT
Start: 2024-12-07 | End: 2024-12-13 | Stop reason: HOSPADM

## 2024-12-07 RX ORDER — NICOTINE 21 MG/24HR
1 PATCH, TRANSDERMAL 24 HOURS TRANSDERMAL DAILY
Status: DISCONTINUED | OUTPATIENT
Start: 2024-12-07 | End: 2024-12-13 | Stop reason: HOSPADM

## 2024-12-07 RX ORDER — LORAZEPAM 2 MG/ML
4 INJECTION INTRAMUSCULAR
Status: DISCONTINUED | OUTPATIENT
Start: 2024-12-07 | End: 2024-12-07

## 2024-12-07 RX ORDER — LORAZEPAM 2 MG/ML
3 INJECTION INTRAMUSCULAR
Status: DISCONTINUED | OUTPATIENT
Start: 2024-12-07 | End: 2024-12-07

## 2024-12-07 RX ORDER — SODIUM CHLORIDE 9 MG/ML
INJECTION, SOLUTION INTRAVENOUS CONTINUOUS
Status: ACTIVE | OUTPATIENT
Start: 2024-12-07 | End: 2024-12-08

## 2024-12-07 RX ADMIN — POTASSIUM CHLORIDE 40 MEQ: 1500 TABLET, EXTENDED RELEASE ORAL at 05:55

## 2024-12-07 RX ADMIN — MAGNESIUM SULFATE HEPTAHYDRATE 2000 MG: 40 INJECTION, SOLUTION INTRAVENOUS at 09:40

## 2024-12-07 RX ADMIN — SODIUM PHOSPHATE, MONOBASIC, MONOHYDRATE AND SODIUM PHOSPHATE, DIBASIC, ANHYDROUS 8.85 MMOL: 142; 276 INJECTION, SOLUTION INTRAVENOUS at 12:06

## 2024-12-07 RX ADMIN — FOLIC ACID 1 MG: 1 TABLET ORAL at 09:12

## 2024-12-07 RX ADMIN — Medication 3 MG: at 01:09

## 2024-12-07 RX ADMIN — ENOXAPARIN SODIUM 40 MG: 100 INJECTION SUBCUTANEOUS at 09:12

## 2024-12-07 RX ADMIN — SODIUM CHLORIDE: 9 INJECTION, SOLUTION INTRAVENOUS at 15:24

## 2024-12-07 RX ADMIN — THIAMINE HCL TAB 100 MG 100 MG: 100 TAB at 09:12

## 2024-12-07 RX ADMIN — Medication 3 MG: at 21:59

## 2024-12-07 RX ADMIN — DULOXETINE HYDROCHLORIDE 60 MG: 60 CAPSULE, DELAYED RELEASE ORAL at 09:12

## 2024-12-07 RX ADMIN — SODIUM CHLORIDE: 9 INJECTION, SOLUTION INTRAVENOUS at 01:12

## 2024-12-07 NOTE — CARE COORDINATION
Case Management Assessment  Initial Evaluation    Date/Time of Evaluation: 12/7/2024 1:57 PM  Assessment Completed by: CYNTHIA AGGARWAL RN    If patient is discharged prior to next notation, then this note serves as note for discharge by case management.    Patient Name: Modesta Tom                   YOB: 1955  Diagnosis: Hypokalemia [E87.6]  Alcohol abuse [F10.10]  General weakness [R53.1]  Unable to care for self [Z78.9]                   Date / Time: 12/6/2024  6:04 PM    Patient Admission Status: Inpatient   Readmission Risk (Low < 19, Mod (19-27), High > 27): Readmission Risk Score: 12.8    Current PCP: No primary care provider on file.  PCP verified by CM?      Chart Reviewed: Yes      History Provided by:    Patient Orientation:      Patient Cognition:      Hospitalization in the last 30 days (Readmission):  No    If yes, Readmission Assessment in CM Navigator will be completed.    Advance Directives:      Code Status: Full Code   Patient's Primary Decision Maker is:        Discharge Planning:    Patient lives with: Alone Type of Home: House  Primary Care Giver:    Patient Support Systems include: Children, Family Members   Current Financial resources:    Current community resources:    Current services prior to admission: None            Current DME:              Type of Home Care services:  None    ADLS  Prior functional level:    Current functional level:      PT AM-PAC:   /24  OT AM-PAC:   /24    Family can provide assistance at DC:    Would you like Case Management to discuss the discharge plan with any other family members/significant others, and if so, who?    Plans to Return to Present Housing:    Other Identified Issues/Barriers to RETURNING to current housing: medical condition  Potential Assistance needed at discharge: Skilled Nursing Facility            Potential DME:    Patient expects to discharge to: Acute rehab  Plan for transportation at discharge:      Financial    Payor:

## 2024-12-07 NOTE — H&P
..  [facility]  Office: [phone]  Barron Conner DO, Singh Pandya DO, Jaxon Stiles DO, Osman Lovell DO, Malvin Castro MD, Bharti Momin MD, Mary Roman MD, Roopa Reyna MD,  Mg Gonzalez MD, Pierre Montenegro MD, Trey Lynn MD,  Christine Yates DO, Tanika Torre MD, Maurizio Jolly MD, Randal Conner DO, Isidra Holt MD,  Adrian David DO, Steffany Lam MD, Jojo Jose MD, Sarah Melton MD, Dayan Fields MD,  Ricardo Spivey MD, Urmila Acosta MD, Vinicio Patricia MD, Ally Nguyen MD, Tommie Hall MD, Amy Muse MD, Rajeev Slater DO, Andrae Chapman MD, Shirley Waterhouse, CNP,  Kesha Thomas, CNP, Rajeev Garg, CNP,  Noemi Evans, AZALIA, Sandy Boyd, CNP, Quyen Zelaya, CNP, Nesha Longoria, CNP, Missy Dejesus, CNP, Rosanne Gilman PA-C, Veena Medeiros PA-C, Harmony Mejia, CNP, Sarai Peterson, CNP,  Brynn Washington, CNP, Ivana Murrieta, CNP,  Mirian Barboza, CNP, Ally Tan, CNP       [facility]   IN-PATIENT SERVICE   [facility]    HISTORY AND PHYSICAL EXAMINATION            Date:   12/7/2024  Patient name:  Modesta Tom  Date of admission:  12/6/2024  6:04 PM  MRN:   [id number]  Account:  [id number]  YOB: 1955  PCP:    No primary care provider on file.  Room:   [address] [place] [address]  Code Status:    Full Code    Chief Complaint:     Chief Complaint   Patient presents with    Extremity Weakness     Pt reports over the past week and a half, her legs have been giving out.        History Obtained From:     patient, electronic medical record    History of Present Illness:     Modesta Tom is a 69 y.o. Non- / non  female who presents with Extremity Weakness (Pt reports over the past week and a half, her legs have been giving out. )   and is admitted to the hospital for the management of Hypokalemia.    Modesta Tom is a 69 y.o. female with PMH of alcohol and tobacco abuse who presents to the ED via private auto

## 2024-12-08 PROBLEM — R29.898 WEAKNESS OF BOTH LEGS: Status: ACTIVE | Noted: 2024-12-08

## 2024-12-08 PROBLEM — F17.200 SMOKING: Status: ACTIVE | Noted: 2024-12-08

## 2024-12-08 PROBLEM — D53.9 MACROCYTIC ANEMIA: Status: ACTIVE | Noted: 2024-12-08

## 2024-12-08 PROBLEM — E87.1 HYPONATREMIA: Status: RESOLVED | Noted: 2024-12-07 | Resolved: 2024-12-08

## 2024-12-08 PROBLEM — E53.8 FOLIC ACID DEFICIENCY: Status: ACTIVE | Noted: 2024-12-08

## 2024-12-08 PROBLEM — E83.39 HYPOPHOSPHATEMIA: Status: ACTIVE | Noted: 2024-12-08

## 2024-12-08 LAB
25(OH)D3 SERPL-MCNC: <6 NG/ML (ref 30–100)
ANION GAP SERPL CALCULATED.3IONS-SCNC: 11 MMOL/L (ref 9–16)
BASOPHILS # BLD: 0 K/UL (ref 0–0.2)
BASOPHILS NFR BLD: 0 %
BUN SERPL-MCNC: 17 MG/DL (ref 8–23)
CALCIUM SERPL-MCNC: 8.1 MG/DL (ref 8.8–10.2)
CHLORIDE SERPL-SCNC: 108 MMOL/L (ref 98–107)
CO2 SERPL-SCNC: 19 MMOL/L (ref 20–31)
CREAT SERPL-MCNC: 0.4 MG/DL (ref 0.5–0.9)
DATE, STOOL #1: NORMAL
EOSINOPHIL # BLD: 0.09 K/UL (ref 0–0.4)
EOSINOPHILS RELATIVE PERCENT: 1 % (ref 1–4)
ERYTHROCYTE [DISTWIDTH] IN BLOOD BY AUTOMATED COUNT: 16.4 % (ref 11.8–14.4)
GFR, ESTIMATED: >90 ML/MIN/1.73M2
GLUCOSE SERPL-MCNC: 123 MG/DL (ref 82–115)
HCT VFR BLD AUTO: 30.5 % (ref 36.3–47.1)
HEMOCCULT SP1 STL QL: NEGATIVE
HGB BLD-MCNC: 10.3 G/DL (ref 11.9–15.1)
IMM GRANULOCYTES # BLD AUTO: 0 K/UL (ref 0–0.3)
IMM GRANULOCYTES NFR BLD: 0 %
LYMPHOCYTES NFR BLD: 0.91 K/UL (ref 1–4.8)
LYMPHOCYTES RELATIVE PERCENT: 10 % (ref 24–44)
MAGNESIUM SERPL-MCNC: 2 MG/DL (ref 1.6–2.4)
MCH RBC QN AUTO: 37.9 PG (ref 25.2–33.5)
MCHC RBC AUTO-ENTMCNC: 33.8 G/DL (ref 28.4–34.8)
MCV RBC AUTO: 112.1 FL (ref 82.6–102.9)
MONOCYTES NFR BLD: 0.64 K/UL (ref 0.2–0.8)
MONOCYTES NFR BLD: 7 % (ref 1–7)
MORPHOLOGY: ABNORMAL
NEUTROPHILS NFR BLD: 82 % (ref 36–66)
NEUTS SEG NFR BLD: 7.46 K/UL (ref 1.8–7.7)
NRBC BLD-RTO: 0 PER 100 WBC
PHOSPHATE SERPL-MCNC: 2.1 MG/DL (ref 2.5–4.5)
PHOSPHATE SERPL-MCNC: 2.3 MG/DL (ref 2.5–4.5)
PLATELET # BLD AUTO: 253 K/UL (ref 138–453)
PMV BLD AUTO: 10.1 FL (ref 8.1–13.5)
POTASSIUM SERPL-SCNC: 3.4 MMOL/L (ref 3.7–5.3)
RBC # BLD AUTO: 2.72 M/UL (ref 3.95–5.11)
SODIUM SERPL-SCNC: 137 MMOL/L (ref 136–145)
TIME, STOOL #1: 730
WBC OTHER # BLD: 9.1 K/UL (ref 3.5–11.3)

## 2024-12-08 PROCEDURE — 84100 ASSAY OF PHOSPHORUS: CPT

## 2024-12-08 PROCEDURE — 6360000002 HC RX W HCPCS: Performed by: STUDENT IN AN ORGANIZED HEALTH CARE EDUCATION/TRAINING PROGRAM

## 2024-12-08 PROCEDURE — 2060000000 HC ICU INTERMEDIATE R&B

## 2024-12-08 PROCEDURE — 99232 SBSQ HOSP IP/OBS MODERATE 35: CPT | Performed by: STUDENT IN AN ORGANIZED HEALTH CARE EDUCATION/TRAINING PROGRAM

## 2024-12-08 PROCEDURE — 80048 BASIC METABOLIC PNL TOTAL CA: CPT

## 2024-12-08 PROCEDURE — 2580000003 HC RX 258: Performed by: STUDENT IN AN ORGANIZED HEALTH CARE EDUCATION/TRAINING PROGRAM

## 2024-12-08 PROCEDURE — 97535 SELF CARE MNGMENT TRAINING: CPT

## 2024-12-08 PROCEDURE — 36415 COLL VENOUS BLD VENIPUNCTURE: CPT

## 2024-12-08 PROCEDURE — 2500000003 HC RX 250 WO HCPCS: Performed by: STUDENT IN AN ORGANIZED HEALTH CARE EDUCATION/TRAINING PROGRAM

## 2024-12-08 PROCEDURE — 82306 VITAMIN D 25 HYDROXY: CPT

## 2024-12-08 PROCEDURE — 97166 OT EVAL MOD COMPLEX 45 MIN: CPT

## 2024-12-08 PROCEDURE — 83735 ASSAY OF MAGNESIUM: CPT

## 2024-12-08 PROCEDURE — 82272 OCCULT BLD FECES 1-3 TESTS: CPT

## 2024-12-08 PROCEDURE — 97116 GAIT TRAINING THERAPY: CPT

## 2024-12-08 PROCEDURE — 2580000003 HC RX 258: Performed by: NURSE PRACTITIONER

## 2024-12-08 PROCEDURE — 6370000000 HC RX 637 (ALT 250 FOR IP): Performed by: STUDENT IN AN ORGANIZED HEALTH CARE EDUCATION/TRAINING PROGRAM

## 2024-12-08 PROCEDURE — 97162 PT EVAL MOD COMPLEX 30 MIN: CPT

## 2024-12-08 PROCEDURE — 6370000000 HC RX 637 (ALT 250 FOR IP)

## 2024-12-08 PROCEDURE — 6370000000 HC RX 637 (ALT 250 FOR IP): Performed by: NURSE PRACTITIONER

## 2024-12-08 PROCEDURE — 85025 COMPLETE CBC W/AUTO DIFF WBC: CPT

## 2024-12-08 RX ORDER — LOPERAMIDE HYDROCHLORIDE 2 MG/1
2 CAPSULE ORAL 4 TIMES DAILY PRN
Status: DISCONTINUED | OUTPATIENT
Start: 2024-12-08 | End: 2024-12-13 | Stop reason: HOSPADM

## 2024-12-08 RX ORDER — ENOXAPARIN SODIUM 100 MG/ML
30 INJECTION SUBCUTANEOUS DAILY
Status: DISCONTINUED | OUTPATIENT
Start: 2024-12-08 | End: 2024-12-13 | Stop reason: HOSPADM

## 2024-12-08 RX ORDER — ERGOCALCIFEROL 1.25 MG/1
50000 CAPSULE, LIQUID FILLED ORAL WEEKLY
Status: DISCONTINUED | OUTPATIENT
Start: 2024-12-08 | End: 2024-12-13 | Stop reason: HOSPADM

## 2024-12-08 RX ADMIN — SODIUM PHOSPHATE, MONOBASIC, MONOHYDRATE AND SODIUM PHOSPHATE, DIBASIC, ANHYDROUS 7.41 MMOL: 142; 276 INJECTION, SOLUTION INTRAVENOUS at 07:19

## 2024-12-08 RX ADMIN — SODIUM CHLORIDE, PRESERVATIVE FREE 10 ML: 5 INJECTION INTRAVENOUS at 20:48

## 2024-12-08 RX ADMIN — POTASSIUM CHLORIDE 40 MEQ: 1500 TABLET, EXTENDED RELEASE ORAL at 07:18

## 2024-12-08 RX ADMIN — FOLIC ACID 1 MG: 1 TABLET ORAL at 09:34

## 2024-12-08 RX ADMIN — THIAMINE HCL TAB 100 MG 100 MG: 100 TAB at 09:34

## 2024-12-08 RX ADMIN — LOPERAMIDE HYDROCHLORIDE 2 MG: 2 CAPSULE ORAL at 18:00

## 2024-12-08 RX ADMIN — ERGOCALCIFEROL 50000 UNITS: 1.25 CAPSULE ORAL at 16:11

## 2024-12-08 RX ADMIN — Medication 3 MG: at 20:48

## 2024-12-08 RX ADMIN — ENOXAPARIN SODIUM 30 MG: 100 INJECTION SUBCUTANEOUS at 09:34

## 2024-12-08 RX ADMIN — DULOXETINE HYDROCHLORIDE 60 MG: 60 CAPSULE, DELAYED RELEASE ORAL at 09:34

## 2024-12-08 NOTE — ED PROVIDER NOTES
hyponatremia.  She received IV fluids, potassium chloride IV piggyback as well as Effer-K orally.  Plan is to admit the patient at [facility] under hospitalist service.    Disposition     FINAL IMPRESSION      1. Hypokalemia    2. General weakness    3. Unable to care for self    4. Alcohol abuse          DISPOSITION/PLAN   DISPOSITION Admitted 12/06/2024 10:40:22 PM               PATIENT REFERRED TO:  No follow-up provider specified.    DISCHARGE MEDICATIONS:  New Prescriptions    No medications on file             (Please note that portions of this note were completed with a voice recognition program.  Efforts were made to edit the dictations but occasionally words are mis-transcribed.)    Julio Cesar Hernández MD,, MD, F.A.C.E.P.  Attending Emergency Medicine Physician                 JulioC esar Hernández MD  12/07/24 0355    
have completed at least one if not all key elements of the E/M (history, physical exam, and MDM). Additional findings are as noted.    Michael Cadet MD  Attending Emergency Physician       Michael Cadet MD  12/08/24 0743    
General weakness    3. Unable to care for self    4. Alcohol abuse          DISPOSITION / PLAN     CONDITION ON DISPOSITION:   Good / Stable for transfer to [facility]    PATIENT REFERRED TO:  No follow-up provider specified.    DISCHARGE MEDICATIONS:  New Prescriptions    No medications on file       Joey Kaba  Emergency Medicine NP    (Please note that portions of this note were completed with a voice recognition program.  Efforts were made to edit the dictations but occasionally words aremis-transcribed.)       Dany Kaba, APRN - CNP  12/06/24 8105

## 2024-12-09 ENCOUNTER — APPOINTMENT (OUTPATIENT)
Dept: ULTRASOUND IMAGING | Age: 69
DRG: 642 | End: 2024-12-09
Payer: MEDICARE

## 2024-12-09 PROBLEM — E44.0 MODERATE MALNUTRITION (HCC): Status: ACTIVE | Noted: 2024-12-09

## 2024-12-09 LAB
ANION GAP SERPL CALCULATED.3IONS-SCNC: 9 MMOL/L (ref 9–16)
BASOPHILS # BLD: 0.08 K/UL (ref 0–0.2)
BASOPHILS NFR BLD: 1 % (ref 0–2)
BUN SERPL-MCNC: 10 MG/DL (ref 8–23)
CALCIUM SERPL-MCNC: 8.3 MG/DL (ref 8.8–10.2)
CHLORIDE SERPL-SCNC: 106 MMOL/L (ref 98–107)
CO2 SERPL-SCNC: 22 MMOL/L (ref 20–31)
CREAT SERPL-MCNC: 0.4 MG/DL (ref 0.5–0.9)
EOSINOPHIL # BLD: 0.15 K/UL (ref 0–0.44)
EOSINOPHILS RELATIVE PERCENT: 2 % (ref 1–4)
ERYTHROCYTE [DISTWIDTH] IN BLOOD BY AUTOMATED COUNT: 16.8 % (ref 11.8–14.4)
GFR, ESTIMATED: >90 ML/MIN/1.73M2
GLUCOSE SERPL-MCNC: 103 MG/DL (ref 82–115)
HCT VFR BLD AUTO: 29 % (ref 36.3–47.1)
HGB BLD-MCNC: 9.6 G/DL (ref 11.9–15.1)
IMM GRANULOCYTES # BLD AUTO: 0 K/UL (ref 0–0.3)
IMM GRANULOCYTES NFR BLD: 0 %
LYMPHOCYTES NFR BLD: 2.25 K/UL (ref 1.1–3.7)
LYMPHOCYTES RELATIVE PERCENT: 30 % (ref 24–43)
MAGNESIUM SERPL-MCNC: 1.8 MG/DL (ref 1.6–2.4)
MCH RBC QN AUTO: 37.8 PG (ref 25.2–33.5)
MCHC RBC AUTO-ENTMCNC: 33.1 G/DL (ref 28.4–34.8)
MCV RBC AUTO: 114.2 FL (ref 82.6–102.9)
MONOCYTES NFR BLD: 0.68 K/UL (ref 0.1–1.2)
MONOCYTES NFR BLD: 9 % (ref 3–12)
MORPHOLOGY: ABNORMAL
NEUTROPHILS NFR BLD: 58 % (ref 36–65)
NEUTS SEG NFR BLD: 4.34 K/UL (ref 1.5–8.1)
NRBC BLD-RTO: 0 PER 100 WBC
PHOSPHATE SERPL-MCNC: 2.2 MG/DL (ref 2.5–4.5)
PHOSPHATE SERPL-MCNC: 3 MG/DL (ref 2.5–4.5)
PLATELET # BLD AUTO: 238 K/UL (ref 138–453)
PMV BLD AUTO: 9.7 FL (ref 8.1–13.5)
POTASSIUM SERPL-SCNC: 3.5 MMOL/L (ref 3.7–5.3)
RBC # BLD AUTO: 2.54 M/UL (ref 3.95–5.11)
SODIUM SERPL-SCNC: 137 MMOL/L (ref 136–145)
WBC OTHER # BLD: 7.5 K/UL (ref 3.5–11.3)

## 2024-12-09 PROCEDURE — 97530 THERAPEUTIC ACTIVITIES: CPT

## 2024-12-09 PROCEDURE — 97110 THERAPEUTIC EXERCISES: CPT

## 2024-12-09 PROCEDURE — 6360000002 HC RX W HCPCS: Performed by: STUDENT IN AN ORGANIZED HEALTH CARE EDUCATION/TRAINING PROGRAM

## 2024-12-09 PROCEDURE — 6370000000 HC RX 637 (ALT 250 FOR IP): Performed by: NURSE PRACTITIONER

## 2024-12-09 PROCEDURE — 99232 SBSQ HOSP IP/OBS MODERATE 35: CPT | Performed by: INTERNAL MEDICINE

## 2024-12-09 PROCEDURE — 2580000003 HC RX 258: Performed by: NURSE PRACTITIONER

## 2024-12-09 PROCEDURE — 97535 SELF CARE MNGMENT TRAINING: CPT

## 2024-12-09 PROCEDURE — 83735 ASSAY OF MAGNESIUM: CPT

## 2024-12-09 PROCEDURE — 84100 ASSAY OF PHOSPHORUS: CPT

## 2024-12-09 PROCEDURE — 85025 COMPLETE CBC W/AUTO DIFF WBC: CPT

## 2024-12-09 PROCEDURE — 2580000003 HC RX 258: Performed by: STUDENT IN AN ORGANIZED HEALTH CARE EDUCATION/TRAINING PROGRAM

## 2024-12-09 PROCEDURE — 80048 BASIC METABOLIC PNL TOTAL CA: CPT

## 2024-12-09 PROCEDURE — 2060000000 HC ICU INTERMEDIATE R&B

## 2024-12-09 PROCEDURE — 99222 1ST HOSP IP/OBS MODERATE 55: CPT | Performed by: GENERAL PRACTICE

## 2024-12-09 PROCEDURE — 2500000003 HC RX 250 WO HCPCS: Performed by: STUDENT IN AN ORGANIZED HEALTH CARE EDUCATION/TRAINING PROGRAM

## 2024-12-09 PROCEDURE — 36415 COLL VENOUS BLD VENIPUNCTURE: CPT

## 2024-12-09 PROCEDURE — 97116 GAIT TRAINING THERAPY: CPT

## 2024-12-09 PROCEDURE — 6370000000 HC RX 637 (ALT 250 FOR IP)

## 2024-12-09 PROCEDURE — 76705 ECHO EXAM OF ABDOMEN: CPT

## 2024-12-09 RX ADMIN — Medication 3 MG: at 21:38

## 2024-12-09 RX ADMIN — SODIUM CHLORIDE, PRESERVATIVE FREE 10 ML: 5 INJECTION INTRAVENOUS at 08:31

## 2024-12-09 RX ADMIN — THIAMINE HCL TAB 100 MG 100 MG: 100 TAB at 08:31

## 2024-12-09 RX ADMIN — FOLIC ACID 1 MG: 1 TABLET ORAL at 08:31

## 2024-12-09 RX ADMIN — POTASSIUM CHLORIDE 40 MEQ: 1500 TABLET, EXTENDED RELEASE ORAL at 08:31

## 2024-12-09 RX ADMIN — SODIUM PHOSPHATE, MONOBASIC, MONOHYDRATE AND SODIUM PHOSPHATE, DIBASIC, ANHYDROUS 7.41 MMOL: 142; 276 INJECTION, SOLUTION INTRAVENOUS at 07:00

## 2024-12-09 RX ADMIN — ENOXAPARIN SODIUM 30 MG: 100 INJECTION SUBCUTANEOUS at 08:32

## 2024-12-09 RX ADMIN — SODIUM CHLORIDE, PRESERVATIVE FREE 10 ML: 5 INJECTION INTRAVENOUS at 21:38

## 2024-12-09 RX ADMIN — DULOXETINE HYDROCHLORIDE 60 MG: 60 CAPSULE, DELAYED RELEASE ORAL at 08:31

## 2024-12-09 NOTE — CARE COORDINATION
Discharge planning    Patient chart reviewed.  Patient lives alone. Independent PTA.  DME in the home: none       Admitted with hypokalemia/hypophosphatemia with alcohol abuse.     Therapy did work with patient with RW. Recommending ARU vs SNF. Unable to offer HC as she does not have PCP>     Will need to follow up with patient regarding snf vs ARU and if wishes to have CM order a RW> per white board PCP list has been given.     1228  Patient seen by therapy recommending rehab. Social work did see and choices obtained. Referrals sent to [facility]> [facility] and [facility]. Did follow up if RW needed at home and she stated she does have one.

## 2024-12-09 NOTE — CARE COORDINATION
Social work: Noted PMR consult recommending acute rehab.  Met with patient, she prefers snf- choice is [facility].   Called [facility], they can accept, they checked and patient has the same in/out network benefits and is covered for rehab at [facility].  Auth submitted via [employer]- pending approval.

## 2024-12-09 NOTE — CONSULTS
CT HEAD WO CONTRAST    Result Date: 12/6/2024  No acute abnormality.        Physical Exam:  BP 93/64   Pulse 91   Temp 98.4 °F (36.9 °C) (Oral)   Resp 16   Ht 1.702 m (5' 7\")   Wt 46.3 kg (102 lb)   SpO2 96%   BMI 15.98 kg/m²     GEN: Well developed, well nourished, no acute distress.  HEENT: NCAT, PERRL, EOMI, mucous membranes pink and moist.  RESP: Lungs clear to auscultation.  Respirations WNL and unlabored.  CV: Regular rate rhythm. No murmurs, rubs, or gallops.  ABD: soft, non-distended, non-tender. BS+ and equal.  NEURO: A&O x 3. Sensation diminished to light touch BLE.   MSK: Functional ROM. Muscle tone and bulk are normal bilaterally. Strength 4+/5 key muscle groups BUE. Strength 3+/5 bilateral hip flexors, 4/5 bilateral plantar/dorsiflexion  EXT: No calf tenderness to palpation or edema BLEs.  SKIN: Warm dry and intact with good turgor.  PSYCH: Mood WNL. Affect WNL. Appropriately interactive.    Impression:    Bilateral leg weakness, worsening   Neuropathy, likely multifactorial (alcohol, folate)  Alcohol abuse, CIWA  Folate deficiency   Hypokalemia  Hyponatremia  Hypophosphatemia   Vitamin D deficiency  Elevated copper   Moderate malnutrition  Recent closed head injury without loss of consciousness (10/07), concerning for mild TBI  Anxiety, depression  Tobacco abuse    Recommendations:    Diagnosis:  Bilateral leg weakness, neuropathy  Therapy: Has PT/OT needs  Medical Necessity: As above  Support: Some family assist from daughter  Rehab Recommendation: In my opinion the patient will require acute inpatient rehabilitation and meets criteria for IRF level care once medically stable per primary and consulting services. Anticipate he/she will be able to tolerate 3 hours of therapy per day or 900 minutes per week in rehabilitation. The patient requires multidisciplinary rehabilitation treatment including medical management by a PM&R physician, 24 hour rehabilitation nursing, Physical/Occupational

## 2024-12-09 NOTE — CARE COORDINATION
Social work:  Met with patient at bedside regarding PT/OT recommendations of SNF.   Patient is agreeable for short-term rehab, choices given of [facility], [facility] and [facility]- referrals sent.                        Post Acute Facility/Agency List     Provided patient with the following list, the list includes the overall star ratings obtained from CMS per the Medicare Web site (www.Medicare.gov):     [] Long Term Acute Care Facilities  [] Acute Inpatient Rehabilitation Facilities  [x] Skilled Nursing Facilities  [] Hospice Facilities  [] Home Care    Provided verbal instructions on how to utilize the QR Code to obtain additional detailed star ratings from www.Medicare.gov     offered to print and provide the detailed list:    []Accepted   [x]Declined

## 2024-12-10 LAB
ANION GAP SERPL CALCULATED.3IONS-SCNC: 9 MMOL/L (ref 9–16)
BASOPHILS # BLD: 0.06 K/UL (ref 0–0.2)
BASOPHILS NFR BLD: 1 % (ref 0–2)
BUN SERPL-MCNC: 8 MG/DL (ref 8–23)
CALCIUM SERPL-MCNC: 8.8 MG/DL (ref 8.8–10.2)
CHLORIDE SERPL-SCNC: 102 MMOL/L (ref 98–107)
CO2 SERPL-SCNC: 28 MMOL/L (ref 20–31)
CREAT SERPL-MCNC: 0.6 MG/DL (ref 0.5–0.9)
EOSINOPHIL # BLD: 0.08 K/UL (ref 0–0.44)
EOSINOPHILS RELATIVE PERCENT: 1 % (ref 1–4)
ERYTHROCYTE [DISTWIDTH] IN BLOOD BY AUTOMATED COUNT: 16.3 % (ref 11.8–14.4)
GFR, ESTIMATED: >90 ML/MIN/1.73M2
GLUCOSE SERPL-MCNC: 114 MG/DL (ref 82–115)
HCT VFR BLD AUTO: 27.6 % (ref 36.3–47.1)
HGB BLD-MCNC: 9.4 G/DL (ref 11.9–15.1)
IMM GRANULOCYTES # BLD AUTO: 0.1 K/UL (ref 0–0.3)
IMM GRANULOCYTES NFR BLD: 2 %
LYMPHOCYTES NFR BLD: 2.07 K/UL (ref 1.1–3.7)
LYMPHOCYTES RELATIVE PERCENT: 30 % (ref 24–43)
MCH RBC QN AUTO: 37.3 PG (ref 25.2–33.5)
MCHC RBC AUTO-ENTMCNC: 34.1 G/DL (ref 28.4–34.8)
MCV RBC AUTO: 109.5 FL (ref 82.6–102.9)
MONOCYTES NFR BLD: 0.69 K/UL (ref 0.1–1.2)
MONOCYTES NFR BLD: 10 % (ref 3–12)
NEUTROPHILS NFR BLD: 56 % (ref 36–65)
NEUTS SEG NFR BLD: 3.88 K/UL (ref 1.5–8.1)
NRBC BLD-RTO: 0 PER 100 WBC
PHOSPHATE SERPL-MCNC: 3.4 MG/DL (ref 2.5–4.5)
PLATELET # BLD AUTO: 212 K/UL (ref 138–453)
PMV BLD AUTO: 10.7 FL (ref 8.1–13.5)
POTASSIUM SERPL-SCNC: 4.9 MMOL/L (ref 3.7–5.3)
RBC # BLD AUTO: 2.52 M/UL (ref 3.95–5.11)
RBC # BLD: ABNORMAL 10*6/UL
RBC # BLD: ABNORMAL 10*6/UL
SODIUM SERPL-SCNC: 139 MMOL/L (ref 136–145)
WBC OTHER # BLD: 6.9 K/UL (ref 3.5–11.3)

## 2024-12-10 PROCEDURE — 80048 BASIC METABOLIC PNL TOTAL CA: CPT

## 2024-12-10 PROCEDURE — 85025 COMPLETE CBC W/AUTO DIFF WBC: CPT

## 2024-12-10 PROCEDURE — 6370000000 HC RX 637 (ALT 250 FOR IP)

## 2024-12-10 PROCEDURE — 2580000003 HC RX 258: Performed by: NURSE PRACTITIONER

## 2024-12-10 PROCEDURE — 84100 ASSAY OF PHOSPHORUS: CPT

## 2024-12-10 PROCEDURE — 6370000000 HC RX 637 (ALT 250 FOR IP): Performed by: NURSE PRACTITIONER

## 2024-12-10 PROCEDURE — 97530 THERAPEUTIC ACTIVITIES: CPT

## 2024-12-10 PROCEDURE — 36415 COLL VENOUS BLD VENIPUNCTURE: CPT

## 2024-12-10 PROCEDURE — 2060000000 HC ICU INTERMEDIATE R&B

## 2024-12-10 PROCEDURE — 99238 HOSP IP/OBS DSCHRG MGMT 30/<: CPT | Performed by: INTERNAL MEDICINE

## 2024-12-10 PROCEDURE — 97116 GAIT TRAINING THERAPY: CPT

## 2024-12-10 RX ORDER — FOLIC ACID 1 MG/1
1 TABLET ORAL DAILY
DISCHARGE
Start: 2024-12-10

## 2024-12-10 RX ORDER — LOPERAMIDE HYDROCHLORIDE 2 MG/1
2 CAPSULE ORAL 4 TIMES DAILY PRN
DISCHARGE
Start: 2024-12-10 | End: 2024-12-20

## 2024-12-10 RX ORDER — ERGOCALCIFEROL 1.25 MG/1
50000 CAPSULE ORAL WEEKLY
DISCHARGE
Start: 2024-12-15

## 2024-12-10 RX ORDER — NICOTINE 21 MG/24HR
1 PATCH, TRANSDERMAL 24 HOURS TRANSDERMAL DAILY
DISCHARGE
Start: 2024-12-10

## 2024-12-10 RX ORDER — THIAMINE MONONITRATE (VIT B1) 100 MG
100 TABLET ORAL DAILY
DISCHARGE
Start: 2024-12-10

## 2024-12-10 RX ADMIN — THIAMINE HCL TAB 100 MG 100 MG: 100 TAB at 09:38

## 2024-12-10 RX ADMIN — DULOXETINE HYDROCHLORIDE 60 MG: 60 CAPSULE, DELAYED RELEASE ORAL at 09:38

## 2024-12-10 RX ADMIN — Medication 3 MG: at 20:27

## 2024-12-10 RX ADMIN — FOLIC ACID 1 MG: 1 TABLET ORAL at 09:38

## 2024-12-10 RX ADMIN — SODIUM CHLORIDE, PRESERVATIVE FREE 10 ML: 5 INJECTION INTRAVENOUS at 09:38

## 2024-12-10 RX ADMIN — SODIUM CHLORIDE, PRESERVATIVE FREE 10 ML: 5 INJECTION INTRAVENOUS at 20:28

## 2024-12-10 NOTE — DISCHARGE SUMMARY
[facility]  Office: [phone]  Barron Conner DO, Singh Pandya DO, Jaxon Stiles DO, Osman Lovell DO, Malvin Castro MD, Bharti Momin MD, Mary Roman MD, Roopa Reyna MD,  Mg Gonzalez MD, Pierre Montenegro MD, Trey Lynn MD,  Christine Yates DO, Tanika Torre MD, Maurizio Jolly MD, Randal Conner DO, Isidra Holt MD,  Adrian David DO, Steffany Lam MD, Jojo Jose MD, Sarah Melton MD, Dyaan Fields MD,  Ricardo Spivey MD, Urmila Acosta MD, Vinicio Patricia MD, Ally Nguyen MD, Tommie Hall MD, Amy Muse MD, Rajeev Slater DO, Andrae Chapman MD, Shirley Waterhouse, CNP,  Kesha Thomas CNP, Rajeev Garg CNP,  Noemi Evans, AZALIA, Sandy Boyd, CNP, Quyen Zelaya, CNP, Nesha Longoria, CNP, Missy Dejesus, CNP, Rosanne Gilman PA-C, Veena Medeiros PA-C, Harmony Mejia, ADELE, Sarai Peterson, CNP,  Brynn Washington, CNP, Ivana Murrieta, CNP,  Mirian Barboza, CNP, Ally Tan, CNP         [facility]   IN-PATIENT SERVICE   [facility]    Discharge Summary     Patient ID: Modesta Tom  :  1955   MRN: [id number]     ACCOUNT:  [id number]   Patient's PCP: No primary care provider on file.  Admit Date: 2024   Discharge Date: 2024     Length of Stay: 7  Code Status:  Full Code  Admitting Physician: No admitting provider for patient encounter.  Discharge Physician: Osman Lovell DO     Active Discharge Diagnoses:     Hospital Problem Lists:  Principal Problem:    Weakness of both legs  Active Problems:    Depression    Hypokalemia    Alcohol abuse    Hypophosphatemia    Macrocytic anemia    Folic acid deficiency    Smoking    Moderate malnutrition (HCC)  Resolved Problems:    Anxiety    COPD (chronic obstructive pulmonary disease) (HCC)    Hyponatremia      Admission Condition:  fair     Discharged Condition: stable    Hospital Stay:     Hospital Course:  Modesta Tom is a 69 y.o. female who was admitted for the

## 2024-12-10 NOTE — DISCHARGE INSTR - COC
Continuity of Care Form    Patient Name: Modesta Tom   :  1955  MRN:  [id number]    Admit date:  2024  Discharge date:  2024    Code Status Order: Full Code   Advance Directives:   Advance Care Flowsheet Documentation             Admitting Physician:  No admitting provider for patient encounter.  PCP: No primary care provider on file.    Discharging Nurse:   Discharging Hospital Unit/Room#: 1005/1005-01  Discharging Unit Phone Number: [phone]    Emergency Contact:   Extended Emergency Contact Information  Primary Emergency Contact: Noemi Durant  Home Phone: [phone]  Relation: Child    Past Surgical History:  History reviewed. No pertinent surgical history.    Immunization History:   Immunization History   Administered Date(s) Administered    COVID-19, PFIZER PURPLE top, DILUTE for use, (age 12 y+), 30mcg/0.3mL 2021, 2021    Influenza Virus Vaccine 10/11/2012, 10/01/2014, 10/10/2015    TDaP, ADACEL (age 10y-64y), BOOSTRIX (age 10y+), IM, 0.5mL 2013       Active Problems:  Patient Active Problem List   Diagnosis Code    Depression F32.A    Hypokalemia E87.6    Alcohol abuse F10.10    Hypophosphatemia E83.39    Macrocytic anemia D53.9    Folic acid deficiency E53.8    Weakness of both legs R29.898    Smoking F17.200    Moderate malnutrition (HCC) E44.0       Isolation/Infection:   Isolation            No Isolation          Patient Infection Status       None to display            Nurse Assessment:  Last Vital Signs: /82   Pulse 82   Temp 98.1 °F (36.7 °C) (Oral)   Resp 16   Ht 1.702 m (5' 7\")   Wt 46.3 kg (102 lb)   SpO2 97%   BMI 15.98 kg/m²     Last documented pain score (0-10 scale):    Last Weight:   Wt Readings from Last 1 Encounters:   24 46.3 kg (102 lb)     Mental Status:  oriented and alert    IV Access:  - None    Nursing Mobility/ADLs:  Walking   Assisted  Transfer  Assisted  Bathing  Independent  Dressing  Independent  Toileting

## 2024-12-11 PROCEDURE — 97110 THERAPEUTIC EXERCISES: CPT

## 2024-12-11 PROCEDURE — 99231 SBSQ HOSP IP/OBS SF/LOW 25: CPT | Performed by: INTERNAL MEDICINE

## 2024-12-11 PROCEDURE — 2060000000 HC ICU INTERMEDIATE R&B

## 2024-12-11 PROCEDURE — 97530 THERAPEUTIC ACTIVITIES: CPT

## 2024-12-11 PROCEDURE — 2580000003 HC RX 258: Performed by: NURSE PRACTITIONER

## 2024-12-11 PROCEDURE — 6370000000 HC RX 637 (ALT 250 FOR IP): Performed by: NURSE PRACTITIONER

## 2024-12-11 PROCEDURE — 6370000000 HC RX 637 (ALT 250 FOR IP)

## 2024-12-11 PROCEDURE — 6360000002 HC RX W HCPCS: Performed by: STUDENT IN AN ORGANIZED HEALTH CARE EDUCATION/TRAINING PROGRAM

## 2024-12-11 PROCEDURE — 97116 GAIT TRAINING THERAPY: CPT

## 2024-12-11 RX ADMIN — THIAMINE HCL TAB 100 MG 100 MG: 100 TAB at 09:18

## 2024-12-11 RX ADMIN — SODIUM CHLORIDE, PRESERVATIVE FREE 10 ML: 5 INJECTION INTRAVENOUS at 20:45

## 2024-12-11 RX ADMIN — Medication 3 MG: at 20:45

## 2024-12-11 RX ADMIN — ENOXAPARIN SODIUM 30 MG: 100 INJECTION SUBCUTANEOUS at 09:18

## 2024-12-11 RX ADMIN — DULOXETINE HYDROCHLORIDE 60 MG: 60 CAPSULE, DELAYED RELEASE ORAL at 09:18

## 2024-12-11 RX ADMIN — SODIUM CHLORIDE, PRESERVATIVE FREE 10 ML: 5 INJECTION INTRAVENOUS at 09:18

## 2024-12-11 RX ADMIN — FOLIC ACID 1 MG: 1 TABLET ORAL at 09:18

## 2024-12-11 NOTE — CARE COORDINATION
Social work: Check [employer] website, authorization is still pending approval.     Update 13:00- spoke to intake/Luis REBOLLAR, case sent to medical review; await outcome.  SW also met with patient to inform and see if she wants to go home instead- she doesn't feel safe going home and wants to wait for precert.

## 2024-12-12 PROCEDURE — 6370000000 HC RX 637 (ALT 250 FOR IP): Performed by: NURSE PRACTITIONER

## 2024-12-12 PROCEDURE — 99231 SBSQ HOSP IP/OBS SF/LOW 25: CPT | Performed by: INTERNAL MEDICINE

## 2024-12-12 PROCEDURE — 2580000003 HC RX 258: Performed by: NURSE PRACTITIONER

## 2024-12-12 PROCEDURE — 6370000000 HC RX 637 (ALT 250 FOR IP)

## 2024-12-12 PROCEDURE — 6360000002 HC RX W HCPCS: Performed by: STUDENT IN AN ORGANIZED HEALTH CARE EDUCATION/TRAINING PROGRAM

## 2024-12-12 PROCEDURE — 2060000000 HC ICU INTERMEDIATE R&B

## 2024-12-12 PROCEDURE — 97110 THERAPEUTIC EXERCISES: CPT

## 2024-12-12 PROCEDURE — 97530 THERAPEUTIC ACTIVITIES: CPT

## 2024-12-12 RX ADMIN — THIAMINE HCL TAB 100 MG 100 MG: 100 TAB at 09:21

## 2024-12-12 RX ADMIN — ENOXAPARIN SODIUM 30 MG: 100 INJECTION SUBCUTANEOUS at 09:21

## 2024-12-12 RX ADMIN — SODIUM CHLORIDE, PRESERVATIVE FREE 10 ML: 5 INJECTION INTRAVENOUS at 09:21

## 2024-12-12 RX ADMIN — DULOXETINE HYDROCHLORIDE 60 MG: 60 CAPSULE, DELAYED RELEASE ORAL at 09:21

## 2024-12-12 RX ADMIN — Medication 3 MG: at 21:14

## 2024-12-12 RX ADMIN — FOLIC ACID 1 MG: 1 TABLET ORAL at 09:21

## 2024-12-12 RX ADMIN — SODIUM CHLORIDE, PRESERVATIVE FREE 10 ML: 5 INJECTION INTRAVENOUS at 21:15

## 2024-12-12 NOTE — CARE COORDINATION
Social work called by insurance [facility] Medicare and offered a peer to peer to allow the precert for snf to [facility] care. Dr. Earline Wetzel is agreeable to do it before noon today.   Phone [phone] option 5  Policy number [id number]  Birth date [birth year]  Noemi roberts

## 2024-12-12 NOTE — CARE COORDINATION
Discharge planning     Peer to peer done. Insurance is requesting PT notes to be faxed. Sent note in epic to [facility]>

## 2024-12-12 NOTE — CARE COORDINATION
Social work: latest therapy notes have been sent in to fax [phone] for [employer] Medicare as directed. Await decision later today deadline is 4 pm to receive this fax. Noemi roberts

## 2024-12-13 VITALS
RESPIRATION RATE: 16 BRPM | TEMPERATURE: 98.2 F | BODY MASS INDEX: 15.19 KG/M2 | SYSTOLIC BLOOD PRESSURE: 98 MMHG | DIASTOLIC BLOOD PRESSURE: 64 MMHG | HEART RATE: 101 BPM | OXYGEN SATURATION: 92 % | HEIGHT: 67 IN | WEIGHT: 96.8 LBS

## 2024-12-13 PROCEDURE — 6370000000 HC RX 637 (ALT 250 FOR IP): Performed by: NURSE PRACTITIONER

## 2024-12-13 PROCEDURE — 97530 THERAPEUTIC ACTIVITIES: CPT

## 2024-12-13 PROCEDURE — 97116 GAIT TRAINING THERAPY: CPT

## 2024-12-13 PROCEDURE — 2580000003 HC RX 258: Performed by: NURSE PRACTITIONER

## 2024-12-13 PROCEDURE — 6370000000 HC RX 637 (ALT 250 FOR IP)

## 2024-12-13 PROCEDURE — 97110 THERAPEUTIC EXERCISES: CPT

## 2024-12-13 PROCEDURE — 99238 HOSP IP/OBS DSCHRG MGMT 30/<: CPT | Performed by: INTERNAL MEDICINE

## 2024-12-13 PROCEDURE — 6360000002 HC RX W HCPCS: Performed by: STUDENT IN AN ORGANIZED HEALTH CARE EDUCATION/TRAINING PROGRAM

## 2024-12-13 RX ADMIN — SODIUM CHLORIDE, PRESERVATIVE FREE 10 ML: 5 INJECTION INTRAVENOUS at 08:53

## 2024-12-13 RX ADMIN — THIAMINE HCL TAB 100 MG 100 MG: 100 TAB at 08:53

## 2024-12-13 RX ADMIN — FOLIC ACID 1 MG: 1 TABLET ORAL at 08:53

## 2024-12-13 RX ADMIN — ENOXAPARIN SODIUM 30 MG: 100 INJECTION SUBCUTANEOUS at 08:53

## 2024-12-13 RX ADMIN — DULOXETINE HYDROCHLORIDE 60 MG: 60 CAPSULE, DELAYED RELEASE ORAL at 08:53

## 2024-12-13 NOTE — PROGRESS NOTES
Physician Progress Note      PATIENT:               CORKY GARLAND  CSN #:                  [id number]  :                       1955  ADMIT DATE:       2024 6:04 PM  DISCH DATE:  RESPONDING  PROVIDER #:        Osman Lovell DO          QUERY TEXT:    Patient admitted with hypokalemia/hypophosphatemia.  Noted documentation of   depression. Please document in progress notes and discharge summary if you are   treating/evaluating the following:  The medical record reflects the following:  Risk Factors: 69 y.o. female with PMH of alcohol and tobacco abuse. Patient   reports drinking about 1 pink of vodka per week.  Clinical Indicators: Depression/anxiety  Treatment: Cymbalta  Options provided:  -- Major depression, recurrent: mild  -- Major depression, recurrent: moderate  -- Major depression, recurrent:  severe without psychotic features  -- Major depression, recurrent: severe with psychotic features  -- Major depression, single episode: mild  -- Major depression, single episode: moderate  -- Major depression, single episode: severe without psychotic features  -- Major depression, single episode: severe with psychotic features  -- Major depression, single episode: unspecified  -- Other - I will add my own diagnosis  -- Disagree - Not applicable / Not valid  -- Disagree - Clinically unable to determine / Unknown  -- Refer to Clinical Documentation Reviewer    PROVIDER RESPONSE TEXT:    Provider is clinically unable to determine a response to this query.    Query created by: Dalila Kwong on 2024 12:56 PM      Electronically signed by:  Osman Lovell DO 2024 4:06 PM          
Comprehensive Nutrition Assessment    Type and Reason for Visit:  Initial, Positive nutrition screen    Nutrition Recommendations/Plan:   Recommend continue ONS TID.   Encourage good po intakes  RD to follow/monitor.      Malnutrition Assessment:  Malnutrition Status:  Moderate malnutrition (12/09/24 1323)    Context:  Acute Illness     Findings of the 6 clinical characteristics of malnutrition:  Energy Intake:  50% or less of estimated energy requirements for 5 or more days  Weight Loss:  Greater than 7.5% over 3 months     Body Fat Loss:  Mild body fat loss     Muscle Mass Loss:  Mild muscle mass loss    Fluid Accumulation:        Strength:       Nutrition Assessment:    Patient is a 69 year old woman who presents with weakness of both legs, alcohol use, history of depression and anxiety. Per chart review, daughter concerned about patients po intake. Patient also reported not being able to make food because she could not walk to the kitchen. Patient was pleasant and endorses UBW as 120#, currently 105#, she states she was not eating as much. She has many Ensures on her tray, hasn't tried them yet but would like them to keep coming on trays. Encouraged good intake of high protein foods. Labs, meds, PMH reviewed.    Nutrition Related Findings:    LBM 12/8, no edema, skin intact Wound Type: None       Current Nutrition Intake & Therapies:    Average Meal Intake: 1-25%  Average Supplements Intake: 0%  ADULT ORAL NUTRITION SUPPLEMENT; Breakfast, Lunch, Dinner; Standard High Calorie/High Protein Oral Supplement  ADULT DIET; Regular    Anthropometric Measures:  Height: 170.2 cm (5' 7\")  Ideal Body Weight (IBW): 135 lbs (61 kg)       Current Body Weight: 46.3 kg (102 lb),   IBW.    Current BMI (kg/m2): 16                             BMI Categories: Underweight (BMI less than 22) age over 65    Estimated Daily Nutrient Needs:  Energy Requirements Based On: Kcal/kg  Weight Used for Energy Requirements: Current  Energy 
IV and telemetry removed, dgtr Noemi in, discharge documents given to patient to transport to facility, patient taken to front entrance via wheelchair and assisted in to private vehicle  
[facility]  Office: [phone]  Barron Conner DO, Singh Pandya DO, Jaxon Stiles DO, Osman Lovell DO, Malvin Castro MD, Bharti Momin MD, Mary Roman MD, Roopa Reyna MD,  Mg Gonzalez MD, Pierre Montenegro MD, Trey Lynn MD,  Christine Yates DO, Tanika Torre MD, Maurizio Jolly MD, Randal Conner DO, Isidra Holt MD,  Adrian David DO, Steffany Lam MD, Jojo Jose MD, Sarah Melton MD, Dayan Fields MD,  Ricardo Spivey MD, Urmila Acosta MD, Vinicio Patricia MD, Ally Nguyen MD, Tommie Hall MD, Amy Muse MD, Rajeev Slater DO, Andrae Chapman MD, Shirley Waterhouse, CNP,  Kesha Thomas CNP, Rajeev Garg, ADELE,  Noemi Evans, AZALIA, Sandy Boyd, CNP, Quyen Zelaya, CNP, Nesha Longoria, CNP, Missy Dejesus, CNP, MIKE MiltonC, MIKE VeraC, Harmony Mejia, CNP, Sarai Peterson, CNP,  Brynn Washington, CNP, Ivana Murrieta, CNP,  Mirian Barboza, CNP, Ally Tan, CNP         [facility]   IN-PATIENT SERVICE   [facility]    Progress Note    12/10/2024    8:57 AM    Name:   Modesta Tom  MRN:     [id number]     Acct:      [id number]   Room:   [facility]/1005-[facility] Day:  4  Admit Date:  12/6/2024  6:04 PM    PCP:   No primary care provider on file.  Code Status:  Full Code    Subjective:     C/C:   Chief Complaint   Patient presents with    Extremity Weakness     Pt reports over the past week and a half, her legs have been giving out.      Interval History Status: improved.     Overall feels better but still weak    Denies cp/sob/n/v    Brief History:     Per my partner:  \"69-year-old female with history of alcohol abuse, smoking, came in with complaints of bilateral leg weakness going on for at least 1 week , has not been eating drinking well because unable to go to kitchen to make food states that she has lost weight in last week as well, found to have hypokalemia denies any vomiting/diarrhea, denies any fever, chills, flulike 
Occupational Therapy  Facility/Department: [facility] PROGRESSIVE CARE  Occupational Therapy Initial Assessment    Name: Modesta Tom  : 1955  MRN: [id number]  Date of Service: 2024    Discharge Recommendations:  Patient would benefit from continued therapy after discharge  OT Equipment Recommendations  Equipment Needed: Yes  Mobility Devices: ADL Assistive Devices  ADL Assistive Devices: Long-handled Sponge;Reacher;Long-handled Shoe Horn;Sock-Aid Hard    Pt is currently functional below baseline and recommend comprehensive and intensive skilled therapy by a multidisciplinary team. Would expect patient to be able to tolerate 3 hours of therapy per day and able to tolerate at least one hour up in chair.  Please refer to AM-PAC score for current mobility/adl level.     RN reports patient is medically stable for therapy treatment this date.    Chart reviewed prior to treatment and patient is agreeable for therapy.  All lines intact and patient positioned comfortably at end of treatment.  All patient needs addressed prior to ending therapy session.           Patient Diagnosis(es): The primary encounter diagnosis was Hypokalemia. Diagnoses of General weakness, Unable to care for self, and Alcohol abuse were also pertinent to this visit.  Past Medical History:  has a past medical history of Alcohol abuse, Anxiety, and Depression.  Past Surgical History:  has no past surgical history on file.     PER HPI: Modesta Tom is a 69 y.o. Non- / non  female who presents with Extremity Weakness (Pt reports over the past week and a half, her legs have been giving out. )   and is admitted to the hospital for the management of Hypokalemia.     Modesta Tom is a 69 y.o. female with PMH of alcohol and tobacco abuse who presents to the ED via private auto with concern for weakness.  Patient reports drinking about 1 pink of vodka per week. Patient reportedly had onset of symptoms beginning 10 days ago.  Patient 
Occupational Therapy  Facility/Department: [facility] PROGRESSIVE CARE  Rehabilitation Occupational Therapy Daily Treatment Note    Date: 24  Patient Name: Modesta Tom       Room: 1005/1005-01  MRN: [id number]  Account: [id number]   : 1955  (69 y.o.) Gender: female         RIA Prieto reports patient is medically stable for therapy treatment this date.    Chart reviewed prior to treatment and patient is agreeable for therapy.  All lines intact and patient positioned comfortably at end of treatment.  All patient needs addressed prior to ending therapy session.               Past Medical History:  has a past medical history of Alcohol abuse, Anxiety, and Depression.  Past Surgical History:   has no past surgical history on file.      Restrictions  Restrictions/Precautions: General Precautions, Fall Risk  Other Position/Activity Restrictions: up with assist, RUE IV  Required Braces or Orthoses?: No      Subjective  Subjective: Pt resting in bed, pleasant and agreeable to OT eval. Pt reporting feeling \"a little bit better.\"                Objective     Cognition  Overall Cognitive Status: Exceptions  Memory: Decreased recall of recent events  Safety Judgement: Decreased awareness of need for safety;Decreased awareness of need for assistance  Problem Solving: Assistance required to identify errors made;Assistance required to correct errors made;Assistance required to implement solutions  Insights: Decreased awareness of deficits  Initiation: Requires cues for some  Sequencing: Requires cues for some  Orientation  Overall Orientation Status: Within Functional Limits  Orientation Level: Oriented to place;Oriented to time;Oriented to situation;Oriented to person         ADL  Upper Extremity Dressing  Assistance Level: Minimal assistance (to tie/adjust hosp gown while seated on EOB)  Lower Extremity Dressing  Assistance Level: Minimal assistance (Pt able to fully pull up and tie hospital scrub pants while standing 
Occupational Therapy  [facility]  Occupational Therapy Not Seen    DATE: 12/10/2024    NAME: Modesta Tom  MRN: [id number]   : 1955    Patient not seen this date for Occupational Therapy due to:      [] Cancel by RN or physician due to:    [] Hemodialysis    [] Critical Lab Value Level     [] Blood transfusion in progress    [] Acute or unstable cardiovascular status   _MAP < 55 or more than >115  _HR < 40 or > 130    [] Acute or unstable pulmonary status   -FiO2 > 60%   _RR < 5 or >40    _O2 sats < 85%    [] Strict Bedrest    [] Off Unit for surgery or procedure    [] Off Unit for testing       [] Pending imaging to R/O fracture    [] Refusal by Patient      [x] Other: No OT needs at this time. Pt reports no difficulties with ADLs and is waiting to be discharged.      [] OT being discontinued at this time. Patient independent. No further needs.     [] OT being discontinued at this time as the patient has been transferred to hospice care. No further needs.      STEVE Whyte    
Patient arrived via private auto with daughter from [facility]. Pt transported to room via wheelchair and stood/pivoted to the bed. VS obtained and telemetry placed. Orders released and admission database initiated. Pt A/O x4. On room air. Denies any pain. Pt oriented to room and call light placed within reach.    [x] Medication Reconciliation was completed and the patient's home medication list was verified. The Med List Status is \"Complete\". The following sources were used to assist with Medication Reconciliation:    [] Med Rec Pharmacist already completed   [] Patient had a list of medications which was transcribed into the EHR.  [] Patient provided bottles of their medications  [x] Home medications reviewed and confirmed with Modesta  [] Contacted patient's pharmacy to confirm home medications  [] Contacted patient's physician office to confirm home medications  [] Medical Records from another facility and/or Care Everywhere were reviewed  [] MAR from facility requested to be faxed over  [] Unable to complete due to patient condition  [] Unable to validate home medications      [] There are one or more home medications that need clarification before Medication Reconciliation can be completed. The Med List Status has been marked as In Progress.     To assist with Home Medication Reconciliation the following actions have been taken:    [] Pharmacy medication reconciliation service requested. (Note: This can be done by sending a Perfect Serve message to The Med Rec Pharmacist or by phoning [phone].)  [] Family requested to bring medications into the hospital  [] Family requested to call hospital with medication list  [] Message left with physician office  [] Request for medical records made to   [] Other         
Physical Therapy  DATE: 2024    NAME: Modesta Tom  MRN: [id number]   : 1955    Patient not seen this date for Physical Therapy due to:      [x] Cancel by RN or physician due to: Blood pressure is soft hold until rechecked and in appropriate range.    [] Hemodialysis    [] Critical Lab Value Level     [] Blood transfusion in progress    [] Acute or unstable cardiovascular status   _MAP < 55 or more than >115  _HR < 40 or > 130    [] Acute or unstable pulmonary status   -FiO2 > 60%   _RR < 5 or >40    _O2 sats < 85%    [] Strict Bedrest    [] Off Unit for surgery or procedure    [] Off Unit for testing       [] Pending imaging to R/O fracture    [] Refusal by Patient      [] Other      [] PT being discontinued at this time. Patient independent. No further needs.     [] PT being discontinued at this time as the patient has been transferred to hospice care. No further needs.      Amanda French, PTA     
Physical Therapy  Facility/Department: [facility] CARE  Rehabilitation Physical Therapy Treatment Note    NAME: Modesta Tom  : 1955 (69 y.o.)  MRN: [id number]  CODE STATUS: Full Code    Date of Service: 12/10/24     Pt currently functioning below baseline.  Recommend daily inpatient skilled therapy at time of discharge to maximize long term outcomes and prevent re-admission. Please refer to AM-PAC score for current level of function.     Restrictions:  Restrictions/Precautions: General Precautions, Fall Risk  Position Activity Restriction  Other Position/Activity Restrictions: up with assist, RUE IV     SUBJECTIVE  Subjective: Patient up in bed upon therapists arrival. Patient agreeable to PT treatment. Patient denies pain at this time.  RN states that patient is appropriate for PT treatment.       OBJECTIVE  Cognition  Overall Cognitive Status: Exceptions  Following Commands: Follows one step commands with repetition  Memory: Decreased recall of recent events  Safety Judgement: Decreased awareness of need for safety;Decreased awareness of need for assistance  Problem Solving: Assistance required to identify errors made;Assistance required to correct errors made;Assistance required to implement solutions  Insights: Decreased awareness of deficits  Initiation: Requires cues for some  Sequencing: Requires cues for some  Orientation  Overall Orientation Status: Within Functional Limits  Orientation Level: Oriented to place;Oriented to time;Oriented to situation;Oriented to person    Functional Mobility  Bed Mobility  Overall Assistance Level: Supervision  Additional Factors: Verbal cues;Set-up;Increased time to complete;Head of bed raised;With handrails  Roll Left  Assistance Level: Independent  Roll Right  Assistance Level: Independent  Skilled Clinical Factors: Patient with good technique and hand placement and progression to side lying.  Sit to Supine  Assistance Level: Supervision  Supine to 
Physical Therapy  Facility/Department: [facility] CARE  Physical Therapy Daily Treatment Note    Name: Modesta Tom  : 1955  MRN: [id number]  Date of Service: 2024    Discharge Recommendations:  Patient would benefit from continued therapy after discharge   PT Equipment Recommendations  Equipment Needed: Yes  Mobility Devices: Walker  Walker: Rolling      Patient Diagnosis(es): The primary encounter diagnosis was Hypokalemia. Diagnoses of General weakness, Unable to care for self, and Alcohol abuse were also pertinent to this visit.  Past Medical History:  has a past medical history of Alcohol abuse, Anxiety, and Depression.  Past Surgical History:  has no past surgical history on file.    Assessment  Body Structures, Functions, Activity Limitations Requiring Skilled Therapeutic Intervention: Decreased functional mobility ;Decreased endurance;Decreased balance;Decreased safe awareness;Decreased cognition  Assessment: Patient continues to fatigue easily and demonstrates decreased safety awareness, patient is a high fall risk due to LE weakness, decreased insight into deficits, and decondiitoned. Recommend continued therapy following discharge.  Specific Instructions for Next Treatment: advance gait as able  Activity Tolerance  Activity Tolerance: Patient limited by endurance;Patient limited by fatigue    Plan  Physical Therapy Plan  General Plan: 6-7 times per week  Specific Instructions for Next Treatment: advance gait as able  Current Treatment Recommendations: Strengthening, Balance training, Functional mobility training, Transfer training, Endurance training, Gait training, Stair training, Safety education & training, Home exercise program, Patient/Caregiver education & training, Positioning, Therapeutic activities  Safety Devices  Type of Devices: Call light within reach, Gait belt, Nurse notified, Patient at risk for falls, Left in bed, Bed alarm in 
Physical Therapy  Facility/Department: [facility] CARE  Rehabilitation Physical Therapy Treatment Note    NAME: Modesta Tom  : 1955 (69 y.o.)  MRN: [id number]  CODE STATUS: Full Code    Date of Service: 24     Pt currently functioning below baseline.  Recommend daily inpatient skilled therapy at time of discharge to maximize long term outcomes and prevent re-admission. Please refer to AM-PAC score for current level of function.     Restrictions:  Restrictions/Precautions: General Precautions, Fall Risk  Position Activity Restriction  Other Position/Activity Restrictions: up with assist, RUE IV     SUBJECTIVE  Subjective: Patient up in bed upon therapists arrival. Patient agreeable to PT treatment. Patient denies pain at this time.  RN states that patient is appropriate for PT treatment.       OBJECTIVE  Cognition  Overall Cognitive Status: Exceptions  Memory: Decreased recall of recent events  Safety Judgement: Decreased awareness of need for safety;Decreased awareness of need for assistance  Problem Solving: Assistance required to identify errors made;Assistance required to correct errors made;Assistance required to implement solutions  Insights: Decreased awareness of deficits  Initiation: Requires cues for some  Sequencing: Requires cues for some  Orientation  Overall Orientation Status: Within Functional Limits  Orientation Level: Oriented to place;Oriented to time;Oriented to situation;Oriented to person    Functional Mobility  Bed Mobility  Overall Assistance Level: Supervision;Stand By Assist  Additional Factors: Verbal cues;Set-up;Increased time to complete;Head of bed raised;With handrails  Roll Left  Assistance Level: Modified independent  Roll Right  Assistance Level: Modified independent  Skilled Clinical Factors: Patient with good technique and hand placement and progression to side lying.  Sit to Supine  Skilled Clinical Factors: unable to assess as patient retires to recliner upon 
Physical Therapy  Facility/Department: [facility] CARE  Physical Therapy Initial Assessment    Name: Modesta Tom  : 1955  MRN: [id number]  Date of Service: 2024    Discharge Recommendations:  Patient would benefit from continued therapy after discharge    Pt is currently functional below baseline and recommend comprehensive and intensive skilled therapy by a multidisciplinary team. Would expect patient to be able to tolerate 3 hours of therapy per day and able to tolerate at least one hour up in chair.  Please refer to AM-PAC score for current mobility/adl level.     PER HPI: Modesta Tom is a 69 y.o. Non- / non  female who presents with Extremity Weakness (Pt reports over the past week and a half, her legs have been giving out. )   and is admitted to the hospital for the management of Hypokalemia.     Modesta Tom is a 69 y.o. female with PMH of alcohol and tobacco abuse who presents to the ED via private auto with concern for weakness.  Patient reports drinking about 1 pink of vodka per week. Patient reportedly had onset of symptoms beginning 10 days ago.  Patient reportedly has felt weak and noticed her legs giving out and had a hard time getting around her house.  Reports this has been progressively worsening for the past 10 days with decreased appetite and weight loss of at least 10 pounds.  Patient denies headache or dizziness.  Denies chest pain or shortness of breath.  Denies abdominal pain vomiting or diarrhea.  Denies urinary symptoms frequency urgency or dysuria.  Additionally patient reports feeling fatigued.  Patient does follow with neurology for known history of neuropathy, anxiety and depression.       Patient Diagnosis(es): The primary encounter diagnosis was Hypokalemia. Diagnoses of General weakness, Unable to care for self, and Alcohol abuse were also pertinent to this visit.  Past Medical History:  has a past medical history of Alcohol abuse, Anxiety, and 
[facility]  Office: [phone]  Barron Conner DO, Singh Pandya DO, Jaxon Stiles DO, Osman Lovell DO, Malvin Castro MD, Bhatri Momin MD, Mary Roman MD, Roopa Reyna MD,  Mg Gonzalez MD, Pierre Montenegro MD, Trey Lynn MD,  Christine Yates DO, Tanika Torre MD, Maurizio Jolly MD, Randal Conner DO, Isidra Holt MD,  Adrian David DO, Steffany Lam MD, Jojo Jose MD, Sarah Melton MD, Dayan Fields MD,  Ricardo Spivey MD, Urmila Acosta MD, Vinicio Patricia MD, Ally Nguyen MD, Tommie Hall MD, Amy Muse MD, Rajeev Slater DO, Andrae Chapman MD, Shirley Waterhouse, CNP,  Kesha Thomas CNP, Rajeev Garg, ADELE,  Noemi Evans, AZALIA, Sandy Boyd, CNP, Quyen Zelaya, CNP, Nesha Longoria, CNP, Missy Dejesus, CNP, MIKE MiltonC, MIKE VeraC, Harmony Mejia, CNP, Sarai Peterson, CNP,  Brynn Washington, CNP, Ivana Murrieta, CNP,  Mirian Barboza, CNP, Ally Tan, CNP         [facility]   IN-PATIENT SERVICE   [facility]    Progress Note    12/12/2024    8:49 AM    Name:   Modesta Tom  MRN:     [id number]     Acct:      [id number]   Room:   [facility]/1005-[facility] Day:  6  Admit Date:  12/6/2024  6:04 PM    PCP:   No primary care provider on file.  Code Status:  Full Code    Subjective:     C/C:   Chief Complaint   Patient presents with    Extremity Weakness     Pt reports over the past week and a half, her legs have been giving out.      Interval History Status: improved.     Overall feels better but still weak    Denies cp/sob/n/v    Brief History:     Per my partner:  \"69-year-old female with history of alcohol abuse, smoking, came in with complaints of bilateral leg weakness going on for at least 1 week , has not been eating drinking well because unable to go to kitchen to make food states that she has lost weight in last week as well, found to have hypokalemia denies any vomiting/diarrhea, denies any fever, chills, flulike 
[facility]  Office: [phone]  Barron Conner DO, Singh Pandya DO, Jaxon Stiles DO, Osman Lovell DO, Malvin Castro MD, Bharti Momin MD, Mary Roman MD, Roopa Reyna MD,  Mg Gonzalez MD, Pierre Montenegro MD, Trey Lynn MD,  Christine Yates DO, Tanika Torre MD, Maurizio Jolly MD, Randal Conner DO, Isidra Holt MD,  Adrian David DO, Steffany Lam MD, Jojo Jose MD, Sarah Melton MD, Dayan Fields MD,  Ricardo Spivey MD, rUmila Acosta MD, Vinicio Patricia MD, Ally Nguyen MD, Tommie Hall MD, Amy Muse MD, Rajeev Slater DO, Andrae Chapman MD, Shirley Waterhouse, CNP,  Kesha Thomas CNP, Rajeev aGrg, ADELE,  Noemi Evans, AZALIA, Sandy Boyd, CNP, Quyen Zelaya, CNP, Nesha Longoria, CNP, Missy Dejesus, CNP, MIKE MiltonC, MIKE VeraC, Harmony Mejia, CNP, Sarai Peterson, CNP,  Brynn Washington, CNP, Ivana Murrieta, CNP,  Mirian Barboza, CNP, Ally Tan, CNP         [facility]   IN-PATIENT SERVICE   [facility]    Progress Note    12/11/2024    11:14 AM    Name:   Modesta Tom  MRN:     [id number]     Acct:      [id number]   Room:   [facility] Day:  5  Admit Date:  12/6/2024  6:04 PM    PCP:   No primary care provider on file.  Code Status:  Full Code    Subjective:     C/C:   Chief Complaint   Patient presents with    Extremity Weakness     Pt reports over the past week and a half, her legs have been giving out.      Interval History Status: improved.     Overall feels better but still weak    Denies cp/sob/n/v    Brief History:     Per my partner:  \"69-year-old female with history of alcohol abuse, smoking, came in with complaints of bilateral leg weakness going on for at least 1 week , has not been eating drinking well because unable to go to kitchen to make food states that she has lost weight in last week as well, found to have hypokalemia denies any vomiting/diarrhea, denies any fever, chills, flulike 
Pt reports having 5 diarrhea stools since this a.m., Dr Patricia notified and order obtained for prn immodium    
Report called to Rachid BRAR at [facility]  
Spiritual Health History and Assessment/Progress Note  [facility]    Initial Encounter,  ,  ,      Name: Modesta Tom MRN: [id number]    Age: 69 y.o.     Sex: female   Language: English   Zoroastrian: Spiritism   Weakness of both legs     Date: 12/9/2024            Total Time Calculated: (P) 6 min              Spiritual Assessment began in [facility]        Referral/Consult From: Rounding   Encounter Overview/Reason: Initial Encounter  Service Provided For: Patient    Nohemi, Belief, Meaning:   Patient identifies as spiritual  Family/Friends No family/friends present      Importance and Influence:  Patient has no beliefs influential to healthcare decision-making identified during this visit  Family/Friends No family/friends present    Community:  Patient feels well-supported. Support system includes: Children  Family/Friends No family/friends present    Assessment and Plan of Care:     Patient Interventions include: Facilitated expression of thoughts and feelings  Family/Friends Interventions include: No family/friends present    Patient Plan of Care: Spiritual Care available upon further referral  Family/Friends Plan of Care: Spiritual Care available upon further referral    Electronically signed by Chaplain Melquiades on 12/9/2024 at 12:52 PM   
Transitions of Care Pharmacy Service   Medication Review    The patient's list of current home medications has been reviewed.     Source(s) of information:  patient, Surescripts    Based on information provided by the above source(s), I have updated the patient's home med list.     Please feel free to call me with any questions about this encounter. Thank you.    Linda Gallardo RPH   Transitions of Care Pharmacy Service  Phone:  [phone]  Fax: [phone]      Electronically signed by Linda Gallardo RPH on 12/7/2024 at 2:11 PM       Medications Prior to Admission: ibuprofen (ADVIL;MOTRIN) 200 MG tablet, Take 1 tablet by mouth every 6 hours as needed for Pain  DULoxetine (CYMBALTA) 60 MG extended release capsule, Take 1 capsule by mouth daily             For Pharmacy Admin Tracking Only  # meds added to list: 1  # meds removed from list: 5  # meds adjusted on list (dose/frequency/formulation): 0    
safety awareness throughout this date.  Patient scoots all the way out and places feet flat on the floor for increased stability and support.  Balance  Sitting Balance: Supervision  Standing Balance: Stand by assistance  Standing Balance  Time: 2 minutes  Activity: Standing in front of EOB working on stability and balance before progression to ambulation.  Transfers  Surface: To bed;Standard toilet;From bed  Additional Factors: Set-up;Verbal cues;Hand placement cues;Increased time to complete  Device: Walker  Sit to Stand  Assistance Level: Supervision  Skilled Clinical Factors: vc's for posture and patient demo's good progression into stance with proper hand placement and pushing off the bed into stance.  Stand to Sit  Assistance Level: Supervision  Skilled Clinical Factors: vc's for slow controled descent into seated posture to maximize eccentric quad control      Environmental Mobility  Ambulation  Surface: Level surface  Device: Rolling walker  Distance: 200 feet x2 and 15 feet x1  Activity: Within Unit;Within Room  Activity Comments: Patient with good technique and hand placement  Additional Factors: Set-up;Verbal cues;Increased time to complete  Assistance Level: Supervision    Neuromuscular Education: Yes  NDT Treatment: Gait ;Lower extremity;Sitting;Standing    PT Exercises  A/AROM Exercises: Seated GERRY LE AROM 1 set x10 reps  Circulation/Endurance Exercises: AP and ankle circles.  Pressure Relief Exercises: Seated lateral WS and glute sets      ASSESSMENT/PROGRESS TOWARDS GOALS    AM-[facility] Inpatient Mobility Raw Score 19   AM-[facility] Inpatient T-Scale Score 45.44   Mobility Inpatient CMS 0-100% Score 41.77   Mobility Inpatient CMS G-Code Modifier CK     Balance  Sitting - Static: Good  Sitting - Dynamic: Good  Standing - Static: Good;-  Standing - Dynamic: Good;-  Single Leg Stance R Le  Single Leg Stance L Leg: 3  Comments: Standing balance with RW    Assessment  Assessment: Patient with progression in 
self care tasks.     Access Code: [email or web address]  URL: [email or web address]  Date: 12/11/2024  Prepared by: [facility]    Exercises  - Seated Shoulder Flexion  - 1 x daily - 7 x weekly - 3 sets - 10 reps  - Seated Shoulder Abduction  - 1 x daily - 7 x weekly - 3 sets - 10 reps  - Seated Shoulder Horizontal Abduction  - 1 x daily - 7 x weekly - 3 sets - 10 reps  - Seated Shoulder Shrugs  - 1 x daily - 7 x weekly - 3 sets - 10 reps  - Seated Scapular Retraction  - 1 x daily - 7 x weekly - 3 sets - 10 reps  - Seated Scapular Protraction  - 1 x daily - 7 x weekly - 3 sets - 10 reps  - Seated Elbow Flexion and Extension AROM  - 1 x daily - 7 x weekly - 3 sets - 10 reps  - Seated Cervical Sidebending Stretch  - 1 x daily - 7 x weekly - 3 sets - 10 reps  - Seated Cervical Rotation AROM  - 1 x daily - 7 x weekly - 3 sets - 10 reps  - Seated Cervical Retraction and Extension  - 1 x daily - 7 x weekly - 3 sets - 10 reps  - Seated Elbow Extension with Self-Anchored Resistance  - 1 x daily - 7 x weekly - 3 sets - 10 reps  - Seated Shoulder Horizontal Abduction with Resistance  - 1 x daily - 7 x weekly - 3 sets - 10 reps  - Seated Elbow Flexion with Self-Anchored Resistance  - 1 x daily - 7 x weekly - 3 sets - 10 reps  - Seated Shoulder Diagonal Pulls with Resistance  - 1 x daily - 7 x weekly - 3 sets - 10 reps      Assessment  Assessment  Assessment: Skilled OT services are indicated to increase I and safety during functional tasks to return home at [facility] as able.  Activity Tolerance: Patient limited by endurance;Patient limited by fatigue  Discharge Recommendations: Patient would benefit from continued therapy after discharge  OT Equipment Recommendations  Equipment Needed: Yes  Mobility Devices: ADL Assistive Devices  ADL Assistive Devices: Long-handled Sponge;Reacher;Long-handled Shoe Horn;Sock-Aid Hard;Emergency Alert System  Safety Devices  Safety Devices in place: Yes  Type of devices: Call light within reach;Left 
None    Result Date: 12/7/2024  1. Acute/subacute compression fracture of T12. 2. Fatty infiltration of the wall of the colon suggesting chronic inflammatory bowel disease.     CT LUMBAR SPINE WO CONTRAST    Result Date: 12/6/2024  1. No acute lumbar fracture evident 2. Bones appear osteoporotic. 3. Mild-to-moderate degenerative changes predominate lower lumbar spine. 4.  Mild bilaterally symmetrical SI joint osteoarthritis. 5. Chronic appearing 20% compression superior endplate T12 with chronic appearing retropulsion component.     CT HEAD WO CONTRAST    Result Date: 12/6/2024  No acute abnormality.       Physical Examination:        General appearance:  alert, cooperative and no distress  Mental Status:  oriented to person, place and normal affect  Lungs:  clear to auscultation bilaterally, normal effort  Heart:  regular rate and rhythm, no murmur  Abdomen:  soft, nontender, nondistended, normal bowel sounds, no masses, hepatomegaly, splenomegaly  Extremities:  no edema, redness, tenderness in the calves  Skin:  no gross lesions, rashes, induration    Assessment:        Hospital Problems             Last Modified POA    * (Principal) Weakness of both legs 12/8/2024 Yes    Depression 12/7/2024 Yes    Hypokalemia 12/8/2024 Yes    Alcohol abuse 12/7/2024 Yes    Hypophosphatemia 12/8/2024 Yes    Macrocytic anemia 12/8/2024 Yes    Folic acid deficiency 12/8/2024 Yes    Smoking 12/8/2024 Yes    Moderate malnutrition (HCC) 12/9/2024 Yes       Plan:        Pt/ot/mobilize  Dc to snf once precert obtained.  If we do not receive precert today, she plans on going home with [facility] and pt    Osman Lovell DO  12/13/2024  11:18 AM     
be related to early cirrhosis. No focal liver lesion is identified.     CT ABDOMEN PELVIS WO CONTRAST Additional Contrast? None    Result Date: 12/7/2024  1. Acute/subacute compression fracture of T12. 2. Fatty infiltration of the wall of the colon suggesting chronic inflammatory bowel disease.     CT LUMBAR SPINE WO CONTRAST    Result Date: 12/6/2024  1. No acute lumbar fracture evident 2. Bones appear osteoporotic. 3. Mild-to-moderate degenerative changes predominate lower lumbar spine. 4.  Mild bilaterally symmetrical SI joint osteoarthritis. 5. Chronic appearing 20% compression superior endplate T12 with chronic appearing retropulsion component.     CT HEAD WO CONTRAST    Result Date: 12/6/2024  No acute abnormality.       Physical Examination:        General appearance:  alert, cooperative and no distress  Mental Status:  oriented to person, place and normal affect; stated 1924  Lungs:  clear to auscultation bilaterally, normal effort  Heart:  regular rate and rhythm, no murmur  Abdomen:  soft, nontender, nondistended, normal bowel sounds, no masses, hepatomegaly, splenomegaly  Extremities:  no edema, redness, tenderness in the calves  Skin:  no gross lesions, rashes, induration    Assessment:        Hospital Problems             Last Modified POA    * (Principal) Weakness of both legs 12/8/2024 Yes    Depression 12/7/2024 Yes    Hypokalemia 12/8/2024 Yes    Alcohol abuse 12/7/2024 Yes    Hypophosphatemia 12/8/2024 Yes    Macrocytic anemia 12/8/2024 Yes    Folic acid deficiency 12/8/2024 Yes    Smoking 12/8/2024 Yes       Plan:        Replace k and phos  Pt/ot/mobilize  Dc planning to snf once precert obtained    Osman LANGLEY Blood, DO  12/9/2024  12:47 PM     
bilaterally symmetrical SI joint osteoarthritis. 5. Chronic appearing 20% compression superior endplate T12 with chronic appearing retropulsion component.     CT HEAD WO CONTRAST    Result Date: 12/6/2024  No acute abnormality.       Physical Examination:        General appearance:  alert, cooperative and no distress  Mental Status:  oriented to person, place and time and normal affect  Lungs:  clear to auscultation bilaterally, normal effort  Heart:  regular rate and rhythm, no murmur  Abdomen:  soft, nontender, nondistended, normal bowel sounds, no masses, hepatomegaly, splenomegaly  Extremities:  no edema, redness, tenderness in the calves  Skin:  no gross lesions, rashes, induration  Neurology-no focal deficits    Assessment:        Hospital Problems             Last Modified POA    * (Principal) Weakness of both legs 12/8/2024 Yes    Depression 12/7/2024 Yes    Hypokalemia 12/8/2024 Yes    Alcohol abuse 12/7/2024 Yes    Hypophosphatemia 12/8/2024 Yes    Macrocytic anemia 12/8/2024 Yes    Folic acid deficiency 12/8/2024 Yes    Smoking 12/8/2024 Yes       Plan:      Hypokalemia/hypophosphatemia-Replace electrolytes as needed  Alcohol abuse-Continue CIWA, folic acid, thiamine, multivitamins  -CT imaging reviewed, follow-up with liver ultrasound, mildly elevated CEA/AFP levels, patient was concerned with family history of cancers, has history of alcohol abuse as well, counseled against alcohol abuse  History of smoking-Counseled against smoking, continue nicotine patch, bronchodilators as needed, PFT as outpatient  Depression-Continue home dose Cymbalta  Macrocytic anemia B12 normal-Continue folic acid replacement, TSH was normal, follow-up with vitamin D levels, .  Imaging reviewed concerning for bilateral symmetric SI joint osteoarthritis,: Wall thickening concerning for IBD, acute/subacute compression of T12, patient denies any back pain or chronic diarrhea, will continue to follow, recommended follow-up with 
IND with EC/WS, fall prevention tech, pressure relief education, discharge recommendations, disease specific education, AE/DME recommendations, cog strategies, with use of handouts as needed      AM-PAC Score        AM-PAC Inpatient Daily Activity Raw Score: 17 (12/09/24 1411)  AM-PAC Inpatient ADL T-Scale Score : 37.26 (12/09/24 1411)  ADL Inpatient CMS 0-100% Score: 50.11 (12/09/24 1411)  ADL Inpatient CMS G-Code Modifier : CK (12/09/24 1411)      Therapy Time   Individual Concurrent Group Co-treatment   Time In 1458         Time Out 1522         Minutes 24                 Huyen COLLIER, OT

## 2024-12-13 NOTE — PLAN OF CARE
Problem: Discharge Planning  Goal: Discharge to home or other facility with appropriate resources  12/7/2024 1721 by Tamra Salvador, RN  Outcome: Progressing     Problem: Safety - Adult  Goal: Free from fall injury  12/7/2024 1721 by Tamra Salvador, RN  Outcome: Progressing     Problem: Nutrition Deficit:  Goal: Optimize nutritional status  12/7/2024 1721 by Tarma Salvador, RN  Outcome: Progressing     
Aox4  RA  Ambulates STB walker  Worked with PT/OT this afternoon  Seizure precautions remain in place  D/C planning in progress  Family at bedside throughout the day, plan of care reviewed and all questions answered  Bed alarm is on, bed locked and in the lowest position, call light within reach, and safety maintained    Problem: Discharge Planning  Goal: Discharge to home or other facility with appropriate resources  12/12/2024 0932 by Meg Thornton RN  Outcome: Progressing     Problem: Safety - Adult  Goal: Free from fall injury  12/12/2024 0932 by Meg Thornton RN  Outcome: Progressing     Problem: Nutrition Deficit:  Goal: Optimize nutritional status  12/12/2024 0932 by Meg Thornton RN  Outcome: Progressing     Problem: Musculoskeletal - Adult  Goal: Return mobility to safest level of function  12/12/2024 0932 by Meg Thornton RN  Outcome: Progressing     Problem: Musculoskeletal - Adult  Goal: Maintain proper alignment of affected body part  12/12/2024 0932 by Meg Thornton RN  Outcome: Progressing     Problem: Musculoskeletal - Adult  Goal: Return ADL status to a safe level of function  12/12/2024 0932 by Meg Thornton RN  Outcome: Progressing     Problem: Gastrointestinal - Adult  Goal: Maintains or returns to baseline bowel function  12/12/2024 0932 by Meg Thornton RN  Outcome: Progressing     Problem: Genitourinary - Adult  Goal: Absence of urinary retention  12/12/2024 0932 by Meg Thornton RN  Outcome: Progressing     Problem: Metabolic/Fluid and Electrolytes - Adult  Goal: Electrolytes maintained within normal limits  12/12/2024 0932 by Meg Thornton RN  Outcome: Progressing     Problem: Metabolic/Fluid and Electrolytes - Adult  Goal: Hemodynamic stability and optimal renal function maintained  12/12/2024 0932 by Meg Thornton RN  Outcome: Progressing     Problem: Pain  Goal: Verbalizes/displays adequate comfort level or baseline comfort level  12/12/2024 0932 by Meg Thornton RN  Outcome: 
Aox4  RA  Seizure precautions in place  Ambulates STB with a walker  Precert to [facility] pending  CIWA score 0  Bed alarm is on, bed locked and in the lowest position, call light within reach, and safety maintained    Problem: Discharge Planning  Goal: Discharge to home or other facility with appropriate resources  12/11/2024 1237 by Meg Thornton RN  Outcome: Progressing     Problem: Safety - Adult  Goal: Free from fall injury  12/11/2024 1237 by Meg Thornton RN  Outcome: Progressing     Problem: Nutrition Deficit:  Goal: Optimize nutritional status  12/11/2024 1237 by Meg Thornton RN  Outcome: Progressing     Problem: Musculoskeletal - Adult  Goal: Return mobility to safest level of function  12/11/2024 1237 by Meg Thornton RN  Outcome: Progressing     Problem: Musculoskeletal - Adult  Goal: Maintain proper alignment of affected body part  12/11/2024 1237 by Meg Thornton RN  Outcome: Progressing     Problem: Musculoskeletal - Adult  Goal: Return ADL status to a safe level of function  12/11/2024 1237 by Meg Thornton RN  Outcome: Progressing     Problem: Gastrointestinal - Adult  Goal: Maintains or returns to baseline bowel function  12/11/2024 1237 by Meg Thornton RN  Outcome: Progressing     Problem: Genitourinary - Adult  Goal: Absence of urinary retention  12/11/2024 1237 by Meg Thornton RN  Outcome: Progressing     Problem: Metabolic/Fluid and Electrolytes - Adult  Goal: Electrolytes maintained within normal limits  12/11/2024 1237 by Meg Thornton RN  Outcome: Progressing     Problem: Metabolic/Fluid and Electrolytes - Adult  Goal: Hemodynamic stability and optimal renal function maintained  12/11/2024 1237 by Meg Thornton RN  Outcome: Progressing     Problem: Pain  Goal: Verbalizes/displays adequate comfort level or baseline comfort level  12/11/2024 1237 by Meg Thornton RN  Outcome: Progressing     Problem: Skin/Tissue Integrity  Goal: Absence of new skin breakdown  Description: 1.  
No significant events noted this shift.  Pt A/O x4. On room air.  Pt denies any pain.  Up with 1 assist, walker.  Safety maintained - bed locked, in lowest position, side rails up x2, alarm activated. Call light placed within reach.    Problem: Discharge Planning  Goal: Discharge to home or other facility with appropriate resources  12/7/2024 2317 by Malcolm Blevins RN  Outcome: Progressing     Problem: Safety - Adult  Goal: Free from fall injury  12/7/2024 2317 by Malcolm Blevins, RN  Outcome: Progressing     Problem: Nutrition Deficit:  Goal: Optimize nutritional status  12/7/2024 2317 by Malcolm Blevins, RN  Outcome: Progressing     
Patient alert and oriented. VSS. NSR on tele. SpO2 mid 90s on RA. Afebrile. No c/o pain. Awaiting precert [facility]. Bed alarm on, call light within reach. Care ongoing.      Problem: Discharge Planning  Goal: Discharge to home or other facility with appropriate resources  Outcome: Progressing     Problem: Safety - Adult  Goal: Free from fall injury  Outcome: Progressing     Problem: Nutrition Deficit:  Goal: Optimize nutritional status  Outcome: Progressing     Problem: Musculoskeletal - Adult  Goal: Return mobility to safest level of function  Outcome: Progressing  Goal: Maintain proper alignment of affected body part  Outcome: Progressing  Goal: Return ADL status to a safe level of function  Outcome: Progressing     Problem: Gastrointestinal - Adult  Goal: Maintains or returns to baseline bowel function  Outcome: Progressing     Problem: Genitourinary - Adult  Goal: Absence of urinary retention  Outcome: Progressing     Problem: Metabolic/Fluid and Electrolytes - Adult  Goal: Electrolytes maintained within normal limits  Outcome: Progressing  Goal: Hemodynamic stability and optimal renal function maintained  Outcome: Progressing     Problem: Pain  Goal: Verbalizes/displays adequate comfort level or baseline comfort level  Outcome: Progressing     Problem: Skin/Tissue Integrity  Goal: Absence of new skin breakdown  Description: 1.  Monitor for areas of redness and/or skin breakdown  2.  Assess vascular access sites hourly  3.  Every 4-6 hours minimum:  Change oxygen saturation probe site  4.  Every 4-6 hours:  If on nasal continuous positive airway pressure, respiratory therapy assess nares and determine need for appliance change or resting period.  Outcome: Progressing     
Patient alert and oriented. VSS. NSR on tele. SpO2 mid 90s on RA. Afebrile. No c/o pain. Several urinary incontinence episodes. Awaiting precert [facility]. Bed alarm on, call light within reach. Care ongoing.      Problem: Discharge Planning  Goal: Discharge to home or other facility with appropriate resources  Outcome: Progressing     Problem: Safety - Adult  Goal: Free from fall injury  Outcome: Progressing     Problem: Nutrition Deficit:  Goal: Optimize nutritional status  Outcome: Progressing     Problem: Musculoskeletal - Adult  Goal: Return mobility to safest level of function  Outcome: Progressing  Goal: Maintain proper alignment of affected body part  Outcome: Progressing  Goal: Return ADL status to a safe level of function  Outcome: Progressing     Problem: Gastrointestinal - Adult  Goal: Maintains or returns to baseline bowel function  Outcome: Progressing     Problem: Genitourinary - Adult  Goal: Absence of urinary retention  Outcome: Progressing     Problem: Metabolic/Fluid and Electrolytes - Adult  Goal: Electrolytes maintained within normal limits  Outcome: Progressing  Goal: Hemodynamic stability and optimal renal function maintained  Outcome: Progressing     Problem: Pain  Goal: Verbalizes/displays adequate comfort level or baseline comfort level  Outcome: Progressing     Problem: Skin/Tissue Integrity  Goal: Absence of new skin breakdown  Description: 1.  Monitor for areas of redness and/or skin breakdown  2.  Assess vascular access sites hourly  3.  Every 4-6 hours minimum:  Change oxygen saturation probe site  4.  Every 4-6 hours:  If on nasal continuous positive airway pressure, respiratory therapy assess nares and determine need for appliance change or resting period.  Outcome: Progressing     
Patient is A&Ox4 is able to ambulate with stand by assist with a walker. Patient worked with PT/OT today, tolerated well. Patient denied pain throughout shift. Patient is a high fall risk, bed alarm is on, bed in lowest position, call device within reach   Problem: Discharge Planning  Goal: Discharge to home or other facility with appropriate resources  12/9/2024 0747 by Ida Christie RN  Outcome: Progressing     Problem: Safety - Adult  Goal: Free from fall injury  12/9/2024 0747 by Ida Christie, RN  Outcome: Progressing     Problem: Nutrition Deficit:  Goal: Optimize nutritional status  Outcome: Progressing     Problem: Musculoskeletal - Adult  Goal: Return mobility to safest level of function  Outcome: Progressing     Problem: Musculoskeletal - Adult  Goal: Maintain proper alignment of affected body part  Outcome: Progressing     Problem: Musculoskeletal - Adult  Goal: Return ADL status to a safe level of function  Outcome: Progressing     Problem: Gastrointestinal - Adult  Goal: Maintains or returns to baseline bowel function  Outcome: Progressing     Problem: Genitourinary - Adult  Goal: Absence of urinary retention  Outcome: Progressing     Problem: Metabolic/Fluid and Electrolytes - Adult  Goal: Electrolytes maintained within normal limits  Outcome: Progressing     Problem: Pain  Goal: Verbalizes/displays adequate comfort level or baseline comfort level  Outcome: Progressing     
Pt had an uneventful night, she was able to ambulate to the bathroom with 1 assist with walker. Vitals were stable, normal sinus on monitor.     Problem: Discharge Planning  Goal: Discharge to home or other facility with appropriate resources  Outcome: Progressing  Discharge to home or other facility with appropriate resources:   Identify barriers to discharge with patient and caregiver   Arrange for needed discharge resources and transportation as appropriate   Arrange for interpreters to assist at discharge as needed   Identify discharge learning needs (meds, wound care, etc)      Problem: Safety - Adult  Goal: Free from fall injury  Outcome: Progressing  Free From Fall Injury:   Instruct family/caregiver on patient safety   Based on caregiver fall risk screen, instruct family/caregiver to ask for assistance with transferring infant if caregiver noted to have fall risk factors      Problem: Musculoskeletal - Adult  Goal: Return mobility to safest level of function  Outcome: Progressing  Return Mobility to Safest Level of Function:   Assess patient stability and activity tolerance for standing, transferring and ambulating with or without assistive devices   Ensure adequate protection for wounds/incisions during mobilization   Assist with transfers and ambulation using safe patient handling equipment as needed               
Pt had an uneventful night. 2 bowel movements during shift. She was able to ambulate to the bathroom with 1 assist. She ate about 25 percent of her dinner tray. No signs of alcohol withdraw at this time. NPO at midnight for US of liver in AM.     Problem: Discharge Planning  Goal: Discharge to home or other facility with appropriate resources  Outcome: Progressing  Discharge to home or other facility with appropriate resources:   Identify barriers to discharge with patient and caregiver   Arrange for needed discharge resources and transportation as appropriate   Arrange for interpreters to assist at discharge as needed   Identify discharge learning needs (meds, wound care, etc)      Problem: Safety - Adult  Goal: Free from fall injury  Outcome: Progressing  Free From Fall Injury:   Instruct family/caregiver on patient safety   Based on caregiver fall risk screen, instruct family/caregiver to ask for assistance with transferring infant if caregiver noted to have fall risk factors        
Pt has been resting comfortably in her room.  Denies pain/discomfort.  Awaiting precert for discharge.  Incontinent at times.   All questions and concerns addressed  Bed is locked and in the lowest position with the call light in reach. Safety maintained.    Problem: Discharge Planning  Goal: Discharge to home or other facility with appropriate resources  Outcome: Progressing  Flowsheets (Taken 12/10/2024 0800)  Discharge to home or other facility with appropriate resources: Identify barriers to discharge with patient and caregiver     Problem: Safety - Adult  Goal: Free from fall injury  Outcome: Progressing     Problem: Musculoskeletal - Adult  Goal: Return mobility to safest level of function  Recent Flowsheet Documentation  Taken 12/10/2024 0800 by Marla Jean Baptiste, RN  Return Mobility to Safest Level of Function: Assess patient stability and activity tolerance for standing, transferring and ambulating with or without assistive devices     
Shift uneventful, magnesium and phos replaced, alert/oriented, room-air, voices no complaints of pain or discomfort, up with x1 assist and use of walker, no s/sx of withdrawals noted, 1500 ml fluid restriction maintained, discharge needs to determined when medically stable    Problem: Safety - Adult  Goal: Free from fall injury  Outcome: Progressing     Problem: Nutrition Deficit:  Goal: Optimize nutritional status  Outcome: Progressing     Problem: Discharge Planning  Goal: Discharge to home or other facility with appropriate resources  Outcome: Progressing     
continuous positive airway pressure, respiratory therapy assess nares and determine need for appliance change or resting period.  Outcome: Progressing

## 2024-12-13 NOTE — CARE COORDINATION
Social work: marylin Vences approved for Patient to dc to [facility] via family at 2:00PM .  # for RN report: [phone]. Completed ANDREW faxed to [phone].  Informed RN, pt, and facility of dc time, agreeable to plan.   HENS completed.

## 2024-12-17 ENCOUNTER — HOSPITAL ENCOUNTER (OUTPATIENT)
Age: 69
Setting detail: SPECIMEN
Discharge: HOME | End: 2024-12-17

## 2024-12-17 LAB
ANION GAP SERPL CALCULATED.3IONS-SCNC: 8 MMOL/L (ref 9–16)
BUN SERPL-MCNC: 14 MG/DL (ref 8–23)
CALCIUM SERPL-MCNC: 9 MG/DL (ref 8.8–10.2)
CHLORIDE SERPL-SCNC: 108 MMOL/L (ref 98–107)
CO2 SERPL-SCNC: 25 MMOL/L (ref 20–31)
CREAT SERPL-MCNC: 0.5 MG/DL (ref 0.5–0.9)
ERYTHROCYTE [DISTWIDTH] IN BLOOD BY AUTOMATED COUNT: 16.1 % (ref 11.8–14.4)
GFR, ESTIMATED: >90 ML/MIN/1.73M2
GLUCOSE SERPL-MCNC: 116 MG/DL (ref 82–115)
HCT VFR BLD AUTO: 30.3 % (ref 36.3–47.1)
HGB BLD-MCNC: 9.9 G/DL (ref 11.9–15.1)
MCH RBC QN AUTO: 36.9 PG (ref 25.2–33.5)
MCHC RBC AUTO-ENTMCNC: 32.7 G/DL (ref 28.4–34.8)
MCV RBC AUTO: 113.1 FL (ref 82.6–102.9)
NRBC BLD-RTO: 0 PER 100 WBC
PLATELET # BLD AUTO: 397 K/UL (ref 138–453)
PMV BLD AUTO: 9.6 FL (ref 8.1–13.5)
POTASSIUM SERPL-SCNC: 5.1 MMOL/L (ref 3.7–5.3)
RBC # BLD AUTO: 2.68 M/UL (ref 3.95–5.11)
SODIUM SERPL-SCNC: 140 MMOL/L (ref 136–145)
WBC OTHER # BLD: 7.6 K/UL (ref 3.5–11.3)

## 2024-12-17 PROCEDURE — 80048 BASIC METABOLIC PNL TOTAL CA: CPT

## 2024-12-17 PROCEDURE — 85027 COMPLETE CBC AUTOMATED: CPT

## 2024-12-17 PROCEDURE — 36415 COLL VENOUS BLD VENIPUNCTURE: CPT

## 2024-12-17 PROCEDURE — P9603 ONE-WAY ALLOW PRORATED MILES: HCPCS

## 2025-01-29 ENCOUNTER — APPOINTMENT (OUTPATIENT)
Dept: GENERAL RADIOLOGY | Age: 70
End: 2025-01-29
Payer: MEDICARE

## 2025-01-29 ENCOUNTER — HOSPITAL ENCOUNTER (EMERGENCY)
Age: 70
Discharge: HOME | End: 2025-01-29
Attending: EMERGENCY MEDICINE
Payer: MEDICARE

## 2025-01-29 ENCOUNTER — APPOINTMENT (OUTPATIENT)
Dept: CT IMAGING | Age: 70
End: 2025-01-29
Payer: MEDICARE

## 2025-01-29 VITALS
OXYGEN SATURATION: 99 % | SYSTOLIC BLOOD PRESSURE: 122 MMHG | BODY MASS INDEX: 18.83 KG/M2 | RESPIRATION RATE: 15 BRPM | TEMPERATURE: 98.1 F | WEIGHT: 120 LBS | HEART RATE: 98 BPM | DIASTOLIC BLOOD PRESSURE: 87 MMHG | HEIGHT: 67 IN

## 2025-01-29 DIAGNOSIS — S42.91XA CLOSED FRACTURE OF RIGHT SHOULDER, INITIAL ENCOUNTER: Primary | ICD-10-CM

## 2025-01-29 PROCEDURE — 73200 CT UPPER EXTREMITY W/O DYE: CPT

## 2025-01-29 PROCEDURE — 6370000000 HC RX 637 (ALT 250 FOR IP): Performed by: EMERGENCY MEDICINE

## 2025-01-29 PROCEDURE — 73030 X-RAY EXAM OF SHOULDER: CPT

## 2025-01-29 PROCEDURE — 99284 EMERGENCY DEPT VISIT MOD MDM: CPT

## 2025-01-29 RX ORDER — OXYCODONE AND ACETAMINOPHEN 5; 325 MG/1; MG/1
1 TABLET ORAL ONCE
Status: COMPLETED | OUTPATIENT
Start: 2025-01-29 | End: 2025-01-29

## 2025-01-29 RX ORDER — OXYCODONE AND ACETAMINOPHEN 5; 325 MG/1; MG/1
1 TABLET ORAL EVERY 6 HOURS PRN
Qty: 15 TABLET | Refills: 0 | Status: SHIPPED | OUTPATIENT
Start: 2025-01-29 | End: 2025-02-03

## 2025-01-29 RX ADMIN — OXYCODONE HYDROCHLORIDE AND ACETAMINOPHEN 1 TABLET: 5; 325 TABLET ORAL at 12:50

## 2025-01-29 ASSESSMENT — PAIN - FUNCTIONAL ASSESSMENT: PAIN_FUNCTIONAL_ASSESSMENT: 0-10

## 2025-01-29 ASSESSMENT — PAIN SCALES - GENERAL
PAINLEVEL_OUTOF10: 9
PAINLEVEL_OUTOF10: 9

## 2025-01-29 ASSESSMENT — PAIN DESCRIPTION - FREQUENCY: FREQUENCY: CONTINUOUS

## 2025-01-29 ASSESSMENT — PAIN DESCRIPTION - PAIN TYPE: TYPE: ACUTE PAIN

## 2025-01-29 ASSESSMENT — PAIN DESCRIPTION - ORIENTATION: ORIENTATION: RIGHT

## 2025-01-29 ASSESSMENT — PAIN DESCRIPTION - DESCRIPTORS: DESCRIPTORS: ACHING;DISCOMFORT

## 2025-01-29 ASSESSMENT — PAIN DESCRIPTION - LOCATION: LOCATION: SHOULDER

## 2025-01-29 NOTE — DISCHARGE INSTRUCTIONS
Keep arm in the sling.  Percocet can be used for pain.  We advise not driving at this time.    It is very important to follow-up with the orthopedic specialist.  Please call them this week for an appointment.  If you have concerns you may return to the emergency room at any time.

## 2025-01-29 NOTE — ED PROVIDER NOTES
EMERGENCY DEPARTMENT ENCOUNTER    Pt Name: Modesta Tom  MRN: [id number]  Birthdate 1955  Date of evaluation: 1/29/25  CHIEF COMPLAINT       Chief Complaint   Patient presents with    Fall     Hurt right shoulder Friday morning from slipping on ice     HISTORY OF PRESENT ILLNESS   69-year-old female presents emergency room for fairly severe right shoulder pain.  Patient had a fall Friday.  She has had quite a bit of pain to the shoulder since.  She is still not able to really move it prompting ED visit today.  She has been trying to take over-the-counter medication with minimal improvement of pain.             REVIEW OF SYSTEMS     Review of Systems   Musculoskeletal:  Positive for arthralgias.     PASTMEDICAL HISTORY     Past Medical History:   Diagnosis Date    Alcohol abuse     Anxiety     Depression      Past Problem List  Patient Active Problem List   Diagnosis Code    Depression F32.A    Hypokalemia E87.6    Alcohol abuse F10.10    Hypophosphatemia E83.39    Macrocytic anemia D53.9    Folic acid deficiency E53.8    Weakness of both legs R29.898    Smoking F17.200    Moderate malnutrition (HCC) E44.0     SURGICAL HISTORY     History reviewed. No pertinent surgical history.  CURRENT MEDICATIONS       Current Discharge Medication List        CONTINUE these medications which have NOT CHANGED    Details   folic acid (FOLVITE) 1 MG tablet Take 1 tablet by mouth daily      melatonin 3 MG TABS tablet Take 1 tablet by mouth nightly      thiamine mononitrate (THIAMINE) 100 MG tablet Take 1 tablet by mouth daily      Ergocalciferol (VITAMIN D) 00291 units CAPS Take 50,000 Units by mouth once a week      nicotine (NICODERM CQ) 21 MG/24HR Place 1 patch onto the skin daily      DULoxetine (CYMBALTA) 60 MG extended release capsule Take 1 capsule by mouth daily  Qty: 90 capsule, Refills: 1    Associated Diagnoses: Neuropathy; Anxiety and depression           ALLERGIES     has No Known Allergies.  FAMILY HISTORY     has no

## 2025-01-29 NOTE — ED NOTES
Pt states pain meds make her nauseous. Pt advised RN could ask doctor for anti-nausea medication. Pt declined wanting anti-nausea medication because it gives her a headache.

## 2025-01-29 NOTE — ED NOTES
Pt to ed c/o right shoulder pain following a fall on Friday morning. Pt rates pain 9/10. Pt a/o x4. Respirations equal and non labored. Visitor at bedside. Bed locked an in lowest position.

## 2025-02-05 ENCOUNTER — OFFICE VISIT (OUTPATIENT)
Dept: ORTHOPEDIC SURGERY | Age: 70
End: 2025-02-05

## 2025-02-05 ENCOUNTER — PREP FOR PROCEDURE (OUTPATIENT)
Dept: ORTHOPEDIC SURGERY | Age: 70
End: 2025-02-05

## 2025-02-05 VITALS — WEIGHT: 118.6 LBS | BODY MASS INDEX: 18.62 KG/M2 | HEIGHT: 67 IN

## 2025-02-05 DIAGNOSIS — M25.511 RIGHT SHOULDER PAIN, UNSPECIFIED CHRONICITY: Primary | ICD-10-CM

## 2025-02-05 PROBLEM — S42.209A FRACTURE OF PROXIMAL HUMERUS: Status: ACTIVE | Noted: 2025-02-05

## 2025-02-05 NOTE — PROGRESS NOTES
9.6 oz)   BMI 18.58 kg/m²   Gen: AAOx3, NAD, appears stated age  CV: distal pulses 2+  Chest: unlabored respirations, equal chest rise bilaterally, no audible wheezes    Ortho Exam  Right upper extremity: Ecchymoses present to the right shoulder and down the humerus.  There is a scar to the anterior shoulder she states is from a previous possible animal bite when she worked at the [employer].  Able to fully range of the wrist and elbow without pain. Ulnar/Radial/Median/AIN/PIN motor nerves grossly intact. Axillary/Ulnar/Radial/Median nerves are SILT. Extremity warm and well perfused.     Radiology:     History: Right small humerus fracture    Comparison: 1/29/2025    Findings: 3 views (AP, Scap Y, axillary) demonstrating significantly displaced right proximal humerus fracture with shortening.  Maintained alignment from previous.    Impression: Significantly displaced right proximal humerus fracture    ASSESSMENT:     1. Right shoulder pain, unspecified chronicity         PLAN:      Today we took x-rays of her right shoulder today. We discussed the etiology of her pain and different treatment options including operative and non-operative.  We discussed both nonoperative treatment with the cuff and collar versus ORIF with plate and screws versus reverse total shoulder arthroplasty.  We have elected to proceed with ORIF of the right proximal humerus.  Patient and daughter are in agreement.  We will proceed with surgery on 2/7/2025.  Until then continue with ice, over-the-counter pain medication, Percocet prescribed by the ER, sling, nonweightbearing.  Patient will follow-up with us 2 weeks postoperatively.  Patient amenable with this plan.    The patient was evaluated and after discussion surgical and non surgical options, the patient has decided to undergo right proximal humerus fracture.  Consent obtained and in chart. All questions answered appropriately. Surgical risks including but not limited to: bleeding, blood clots,

## 2025-02-06 ENCOUNTER — ANESTHESIA EVENT (OUTPATIENT)
Dept: OPERATING ROOM | Age: 70
End: 2025-02-06
Payer: MEDICARE

## 2025-02-06 RX ORDER — IBUPROFEN 200 MG
800 TABLET ORAL 3 TIMES DAILY
COMMUNITY

## 2025-02-06 RX ORDER — OXYCODONE AND ACETAMINOPHEN 5; 325 MG/1; MG/1
1 TABLET ORAL NIGHTLY PRN
COMMUNITY

## 2025-02-07 ENCOUNTER — HOSPITAL ENCOUNTER (OUTPATIENT)
Age: 70
Setting detail: OUTPATIENT SURGERY
Discharge: HOME | End: 2025-02-07
Attending: STUDENT IN AN ORGANIZED HEALTH CARE EDUCATION/TRAINING PROGRAM | Admitting: STUDENT IN AN ORGANIZED HEALTH CARE EDUCATION/TRAINING PROGRAM
Payer: MEDICARE

## 2025-02-07 ENCOUNTER — APPOINTMENT (OUTPATIENT)
Dept: GENERAL RADIOLOGY | Age: 70
End: 2025-02-07
Attending: STUDENT IN AN ORGANIZED HEALTH CARE EDUCATION/TRAINING PROGRAM
Payer: MEDICARE

## 2025-02-07 ENCOUNTER — ANESTHESIA (OUTPATIENT)
Dept: OPERATING ROOM | Age: 70
End: 2025-02-07
Payer: MEDICARE

## 2025-02-07 VITALS
DIASTOLIC BLOOD PRESSURE: 69 MMHG | SYSTOLIC BLOOD PRESSURE: 114 MMHG | TEMPERATURE: 96.8 F | WEIGHT: 120 LBS | RESPIRATION RATE: 30 BRPM | OXYGEN SATURATION: 92 % | BODY MASS INDEX: 18.83 KG/M2 | HEIGHT: 67 IN | HEART RATE: 78 BPM

## 2025-02-07 DIAGNOSIS — G89.18 POST-OP PAIN: Primary | ICD-10-CM

## 2025-02-07 LAB
POTASSIUM BLD-SCNC: 4.6 MMOL/L (ref 3.5–4.5)
SODIUM BLD-SCNC: 142 MMOL/L (ref 138–146)

## 2025-02-07 PROCEDURE — 84132 ASSAY OF SERUM POTASSIUM: CPT

## 2025-02-07 PROCEDURE — 6360000002 HC RX W HCPCS

## 2025-02-07 PROCEDURE — 6360000002 HC RX W HCPCS: Performed by: ANESTHESIOLOGY

## 2025-02-07 PROCEDURE — 2580000003 HC RX 258

## 2025-02-07 PROCEDURE — 3700000000 HC ANESTHESIA ATTENDED CARE: Performed by: STUDENT IN AN ORGANIZED HEALTH CARE EDUCATION/TRAINING PROGRAM

## 2025-02-07 PROCEDURE — 2500000003 HC RX 250 WO HCPCS

## 2025-02-07 PROCEDURE — 84295 ASSAY OF SERUM SODIUM: CPT

## 2025-02-07 PROCEDURE — 3700000001 HC ADD 15 MINUTES (ANESTHESIA): Performed by: STUDENT IN AN ORGANIZED HEALTH CARE EDUCATION/TRAINING PROGRAM

## 2025-02-07 PROCEDURE — 2500000003 HC RX 250 WO HCPCS: Performed by: STUDENT IN AN ORGANIZED HEALTH CARE EDUCATION/TRAINING PROGRAM

## 2025-02-07 PROCEDURE — 73030 X-RAY EXAM OF SHOULDER: CPT

## 2025-02-07 PROCEDURE — 7100000000 HC PACU RECOVERY - FIRST 15 MIN: Performed by: STUDENT IN AN ORGANIZED HEALTH CARE EDUCATION/TRAINING PROGRAM

## 2025-02-07 PROCEDURE — 7100000011 HC PHASE II RECOVERY - ADDTL 15 MIN: Performed by: STUDENT IN AN ORGANIZED HEALTH CARE EDUCATION/TRAINING PROGRAM

## 2025-02-07 PROCEDURE — 93005 ELECTROCARDIOGRAM TRACING: CPT | Performed by: STUDENT IN AN ORGANIZED HEALTH CARE EDUCATION/TRAINING PROGRAM

## 2025-02-07 PROCEDURE — 64415 NJX AA&/STRD BRCH PLXS IMG: CPT | Performed by: ANESTHESIOLOGY

## 2025-02-07 PROCEDURE — 3600000004 HC SURGERY LEVEL 4 BASE: Performed by: STUDENT IN AN ORGANIZED HEALTH CARE EDUCATION/TRAINING PROGRAM

## 2025-02-07 PROCEDURE — 7100000001 HC PACU RECOVERY - ADDTL 15 MIN: Performed by: STUDENT IN AN ORGANIZED HEALTH CARE EDUCATION/TRAINING PROGRAM

## 2025-02-07 PROCEDURE — C1713 ANCHOR/SCREW BN/BN,TIS/BN: HCPCS | Performed by: STUDENT IN AN ORGANIZED HEALTH CARE EDUCATION/TRAINING PROGRAM

## 2025-02-07 PROCEDURE — 2709999900 HC NON-CHARGEABLE SUPPLY: Performed by: STUDENT IN AN ORGANIZED HEALTH CARE EDUCATION/TRAINING PROGRAM

## 2025-02-07 PROCEDURE — 6370000000 HC RX 637 (ALT 250 FOR IP): Performed by: ANESTHESIOLOGY

## 2025-02-07 PROCEDURE — C1762 CONN TISS, HUMAN(INC FASCIA): HCPCS | Performed by: STUDENT IN AN ORGANIZED HEALTH CARE EDUCATION/TRAINING PROGRAM

## 2025-02-07 PROCEDURE — C1769 GUIDE WIRE: HCPCS | Performed by: STUDENT IN AN ORGANIZED HEALTH CARE EDUCATION/TRAINING PROGRAM

## 2025-02-07 PROCEDURE — 2720000010 HC SURG SUPPLY STERILE: Performed by: STUDENT IN AN ORGANIZED HEALTH CARE EDUCATION/TRAINING PROGRAM

## 2025-02-07 PROCEDURE — 6360000002 HC RX W HCPCS: Performed by: NURSE ANESTHETIST, CERTIFIED REGISTERED

## 2025-02-07 PROCEDURE — 6360000002 HC RX W HCPCS: Performed by: STUDENT IN AN ORGANIZED HEALTH CARE EDUCATION/TRAINING PROGRAM

## 2025-02-07 PROCEDURE — 3600000014 HC SURGERY LEVEL 4 ADDTL 15MIN: Performed by: STUDENT IN AN ORGANIZED HEALTH CARE EDUCATION/TRAINING PROGRAM

## 2025-02-07 PROCEDURE — 7100000010 HC PHASE II RECOVERY - FIRST 15 MIN: Performed by: STUDENT IN AN ORGANIZED HEALTH CARE EDUCATION/TRAINING PROGRAM

## 2025-02-07 DEVICE — SCREW BNE L34MM DIA3.5MM CORT S STL ST LOK FULL THRD: Type: IMPLANTABLE DEVICE | Site: ARM | Status: FUNCTIONAL

## 2025-02-07 DEVICE — SCREW BNE L40MM DIA3.5MM CORT S STL ST LOK FULL THRD: Type: IMPLANTABLE DEVICE | Site: ARM | Status: FUNCTIONAL

## 2025-02-07 DEVICE — SCREW BNE L22MM DIA3.5MM CORT S STL ST NONCANNULATED LOK: Type: IMPLANTABLE DEVICE | Site: ARM | Status: FUNCTIONAL

## 2025-02-07 DEVICE — PLATE BNE L90MM STD 3 H PROX HUM S STL LOK COMPR FOR 3.5MM: Type: IMPLANTABLE DEVICE | Site: ARM | Status: FUNCTIONAL

## 2025-02-07 DEVICE — SCREW BNE L50MM DIA3.5MM CORT S STL ST LOK FULL THRD: Type: IMPLANTABLE DEVICE | Site: ARM | Status: FUNCTIONAL

## 2025-02-07 DEVICE — ALLOGRAFT BNE 60 MM PRESERVON FIBULAR SHFT MATRIGRAFT: Type: IMPLANTABLE DEVICE | Site: ARM | Status: FUNCTIONAL

## 2025-02-07 DEVICE — SCREW BNE L36MM DIA3.5MM CORT S STL ST LOK FULL THRD: Type: IMPLANTABLE DEVICE | Site: ARM | Status: FUNCTIONAL

## 2025-02-07 DEVICE — GII QUICKANCHOR PLUS SIZE 2 (5 METRIC) PANACRYL BRAIDED ABSORBABLE SUTURE 36 INCHES (91CM), DOUBLE ARMED WITH CP-2 NEEDLES, WITH DISPOSABLE INSERTER.
Type: IMPLANTABLE DEVICE | Site: ARM | Status: FUNCTIONAL
Brand: GII QUICKANCHOR PANACRYL

## 2025-02-07 DEVICE — SCREW BNE L26MM DIA3.5MM CORT S STL ST NONCANNULATED LOK: Type: IMPLANTABLE DEVICE | Site: ARM | Status: FUNCTIONAL

## 2025-02-07 RX ORDER — CYCLOBENZAPRINE HCL 10 MG
10 TABLET ORAL 3 TIMES DAILY PRN
Qty: 50 TABLET | Refills: 0 | Status: SHIPPED | OUTPATIENT
Start: 2025-02-07

## 2025-02-07 RX ORDER — TRANEXAMIC ACID 10 MG/ML
INJECTION, SOLUTION INTRAVENOUS
Status: DISCONTINUED | OUTPATIENT
Start: 2025-02-07 | End: 2025-02-07 | Stop reason: SDUPTHER

## 2025-02-07 RX ORDER — DROPERIDOL 2.5 MG/ML
0.62 INJECTION, SOLUTION INTRAMUSCULAR; INTRAVENOUS
Status: DISCONTINUED | OUTPATIENT
Start: 2025-02-07 | End: 2025-02-07 | Stop reason: HOSPADM

## 2025-02-07 RX ORDER — VANCOMYCIN HYDROCHLORIDE 1 G/20ML
INJECTION, POWDER, LYOPHILIZED, FOR SOLUTION INTRAVENOUS PRN
Status: DISCONTINUED | OUTPATIENT
Start: 2025-02-07 | End: 2025-02-07 | Stop reason: ALTCHOICE

## 2025-02-07 RX ORDER — IPRATROPIUM BROMIDE AND ALBUTEROL SULFATE 2.5; .5 MG/3ML; MG/3ML
1 SOLUTION RESPIRATORY (INHALATION) ONCE
Status: COMPLETED | OUTPATIENT
Start: 2025-02-07 | End: 2025-02-07

## 2025-02-07 RX ORDER — SODIUM CHLORIDE 0.9 % (FLUSH) 0.9 %
5-40 SYRINGE (ML) INJECTION PRN
Status: DISCONTINUED | OUTPATIENT
Start: 2025-02-07 | End: 2025-02-07 | Stop reason: HOSPADM

## 2025-02-07 RX ORDER — OXYCODONE HYDROCHLORIDE 5 MG/1
5 TABLET ORAL
Qty: 42 TABLET | Refills: 0 | Status: SHIPPED | OUTPATIENT
Start: 2025-02-07 | End: 2025-02-14

## 2025-02-07 RX ORDER — SODIUM CHLORIDE 9 MG/ML
INJECTION, SOLUTION INTRAVENOUS PRN
Status: DISCONTINUED | OUTPATIENT
Start: 2025-02-07 | End: 2025-02-07 | Stop reason: HOSPADM

## 2025-02-07 RX ORDER — TOBRAMYCIN 1.2 G/30ML
INJECTION, POWDER, LYOPHILIZED, FOR SOLUTION INTRAVENOUS PRN
Status: DISCONTINUED | OUTPATIENT
Start: 2025-02-07 | End: 2025-02-07 | Stop reason: ALTCHOICE

## 2025-02-07 RX ORDER — ONDANSETRON 2 MG/ML
INJECTION INTRAMUSCULAR; INTRAVENOUS
Status: DISCONTINUED | OUTPATIENT
Start: 2025-02-07 | End: 2025-02-07 | Stop reason: SDUPTHER

## 2025-02-07 RX ORDER — MEPERIDINE HYDROCHLORIDE 50 MG/ML
12.5 INJECTION INTRAMUSCULAR; INTRAVENOUS; SUBCUTANEOUS EVERY 5 MIN PRN
Status: DISCONTINUED | OUTPATIENT
Start: 2025-02-07 | End: 2025-02-07 | Stop reason: HOSPADM

## 2025-02-07 RX ORDER — FENTANYL CITRATE 50 UG/ML
INJECTION, SOLUTION INTRAMUSCULAR; INTRAVENOUS
Status: DISCONTINUED | OUTPATIENT
Start: 2025-02-07 | End: 2025-02-07 | Stop reason: SDUPTHER

## 2025-02-07 RX ORDER — DEXAMETHASONE SODIUM PHOSPHATE 10 MG/ML
INJECTION INTRAMUSCULAR; INTRAVENOUS
Status: DISCONTINUED | OUTPATIENT
Start: 2025-02-07 | End: 2025-02-07 | Stop reason: SDUPTHER

## 2025-02-07 RX ORDER — DOCUSATE SODIUM 100 MG/1
100 CAPSULE, LIQUID FILLED ORAL 2 TIMES DAILY PRN
Qty: 20 CAPSULE | Refills: 0 | Status: SHIPPED | OUTPATIENT
Start: 2025-02-07

## 2025-02-07 RX ORDER — ACETAMINOPHEN 500 MG
1000 TABLET ORAL EVERY 6 HOURS PRN
Qty: 112 TABLET | Refills: 0 | Status: SHIPPED | OUTPATIENT
Start: 2025-02-07

## 2025-02-07 RX ORDER — MIDAZOLAM HYDROCHLORIDE 2 MG/2ML
2 INJECTION, SOLUTION INTRAMUSCULAR; INTRAVENOUS ONCE
Status: COMPLETED | OUTPATIENT
Start: 2025-02-07 | End: 2025-02-07

## 2025-02-07 RX ORDER — MIDAZOLAM HYDROCHLORIDE 2 MG/2ML
2 INJECTION, SOLUTION INTRAMUSCULAR; INTRAVENOUS ONCE
Status: CANCELLED | OUTPATIENT
Start: 2025-02-07 | End: 2025-02-07

## 2025-02-07 RX ORDER — DIPHENHYDRAMINE HYDROCHLORIDE 50 MG/ML
12.5 INJECTION INTRAMUSCULAR; INTRAVENOUS
Status: DISCONTINUED | OUTPATIENT
Start: 2025-02-07 | End: 2025-02-07 | Stop reason: HOSPADM

## 2025-02-07 RX ORDER — ROCURONIUM BROMIDE 10 MG/ML
INJECTION, SOLUTION INTRAVENOUS
Status: DISCONTINUED | OUTPATIENT
Start: 2025-02-07 | End: 2025-02-07 | Stop reason: SDUPTHER

## 2025-02-07 RX ORDER — SODIUM CHLORIDE 0.9 % (FLUSH) 0.9 %
5-40 SYRINGE (ML) INJECTION EVERY 12 HOURS SCHEDULED
Status: DISCONTINUED | OUTPATIENT
Start: 2025-02-07 | End: 2025-02-07 | Stop reason: HOSPADM

## 2025-02-07 RX ORDER — METOCLOPRAMIDE HYDROCHLORIDE 5 MG/ML
10 INJECTION INTRAMUSCULAR; INTRAVENOUS
Status: DISCONTINUED | OUTPATIENT
Start: 2025-02-07 | End: 2025-02-07 | Stop reason: HOSPADM

## 2025-02-07 RX ORDER — HYDRALAZINE HYDROCHLORIDE 20 MG/ML
10 INJECTION INTRAMUSCULAR; INTRAVENOUS
Status: DISCONTINUED | OUTPATIENT
Start: 2025-02-07 | End: 2025-02-07 | Stop reason: HOSPADM

## 2025-02-07 RX ORDER — NALOXONE HYDROCHLORIDE 0.4 MG/ML
INJECTION, SOLUTION INTRAMUSCULAR; INTRAVENOUS; SUBCUTANEOUS PRN
Status: DISCONTINUED | OUTPATIENT
Start: 2025-02-07 | End: 2025-02-07 | Stop reason: HOSPADM

## 2025-02-07 RX ORDER — MAGNESIUM HYDROXIDE 1200 MG/15ML
LIQUID ORAL CONTINUOUS PRN
Status: COMPLETED | OUTPATIENT
Start: 2025-02-07 | End: 2025-02-07

## 2025-02-07 RX ORDER — PROPOFOL 10 MG/ML
INJECTION, EMULSION INTRAVENOUS
Status: DISCONTINUED | OUTPATIENT
Start: 2025-02-07 | End: 2025-02-07 | Stop reason: SDUPTHER

## 2025-02-07 RX ORDER — BUPIVACAINE HYDROCHLORIDE 5 MG/ML
INJECTION, SOLUTION EPIDURAL; INTRACAUDAL
Status: COMPLETED | OUTPATIENT
Start: 2025-02-07 | End: 2025-02-07

## 2025-02-07 RX ORDER — FENTANYL CITRATE 50 UG/ML
100 INJECTION, SOLUTION INTRAMUSCULAR; INTRAVENOUS ONCE
Status: COMPLETED | OUTPATIENT
Start: 2025-02-07 | End: 2025-02-07

## 2025-02-07 RX ORDER — FENTANYL CITRATE 50 UG/ML
100 INJECTION, SOLUTION INTRAMUSCULAR; INTRAVENOUS ONCE
Status: CANCELLED | OUTPATIENT
Start: 2025-02-07 | End: 2025-02-07

## 2025-02-07 RX ORDER — PHENYLEPHRINE HCL IN 0.9% NACL 1 MG/10 ML
SYRINGE (ML) INTRAVENOUS
Status: DISCONTINUED | OUTPATIENT
Start: 2025-02-07 | End: 2025-02-07 | Stop reason: SDUPTHER

## 2025-02-07 RX ORDER — LIDOCAINE HYDROCHLORIDE 10 MG/ML
INJECTION, SOLUTION EPIDURAL; INFILTRATION; INTRACAUDAL; PERINEURAL
Status: DISCONTINUED | OUTPATIENT
Start: 2025-02-07 | End: 2025-02-07 | Stop reason: SDUPTHER

## 2025-02-07 RX ORDER — SODIUM CHLORIDE, SODIUM LACTATE, POTASSIUM CHLORIDE, CALCIUM CHLORIDE 600; 310; 30; 20 MG/100ML; MG/100ML; MG/100ML; MG/100ML
INJECTION, SOLUTION INTRAVENOUS
Status: DISCONTINUED | OUTPATIENT
Start: 2025-02-07 | End: 2025-02-07 | Stop reason: SDUPTHER

## 2025-02-07 RX ORDER — ERGOCALCIFEROL 1.25 MG/1
50000 CAPSULE ORAL WEEKLY
Qty: 4 CAPSULE | Refills: 5 | Status: SHIPPED | OUTPATIENT
Start: 2025-02-07

## 2025-02-07 RX ADMIN — Medication 100 MCG: at 14:15

## 2025-02-07 RX ADMIN — FENTANYL CITRATE 100 MCG: 50 INJECTION INTRAMUSCULAR; INTRAVENOUS at 11:29

## 2025-02-07 RX ADMIN — ROCURONIUM BROMIDE 50 MG: 10 INJECTION, SOLUTION INTRAVENOUS at 12:26

## 2025-02-07 RX ADMIN — SODIUM CHLORIDE, POTASSIUM CHLORIDE, SODIUM LACTATE AND CALCIUM CHLORIDE: 600; 310; 30; 20 INJECTION, SOLUTION INTRAVENOUS at 13:34

## 2025-02-07 RX ADMIN — LIDOCAINE HYDROCHLORIDE 50 MG: 10 INJECTION, SOLUTION EPIDURAL; INFILTRATION; INTRACAUDAL; PERINEURAL at 12:26

## 2025-02-07 RX ADMIN — FENTANYL CITRATE 25 MCG: 50 INJECTION, SOLUTION INTRAMUSCULAR; INTRAVENOUS at 14:54

## 2025-02-07 RX ADMIN — TRANEXAMIC ACID 1 G: 10 INJECTION, SOLUTION INTRAVENOUS at 13:01

## 2025-02-07 RX ADMIN — BUPIVACAINE 10 ML: 13.3 INJECTION, SUSPENSION, LIPOSOMAL INFILTRATION at 11:25

## 2025-02-07 RX ADMIN — FENTANYL CITRATE 25 MCG: 50 INJECTION, SOLUTION INTRAMUSCULAR; INTRAVENOUS at 14:49

## 2025-02-07 RX ADMIN — SODIUM CHLORIDE, POTASSIUM CHLORIDE, SODIUM LACTATE AND CALCIUM CHLORIDE: 600; 310; 30; 20 INJECTION, SOLUTION INTRAVENOUS at 12:26

## 2025-02-07 RX ADMIN — ROCURONIUM BROMIDE 10 MG: 10 INJECTION, SOLUTION INTRAVENOUS at 13:00

## 2025-02-07 RX ADMIN — BUPIVACAINE HYDROCHLORIDE 10 ML: 5 INJECTION, SOLUTION EPIDURAL; INTRACAUDAL; PERINEURAL at 11:25

## 2025-02-07 RX ADMIN — MIDAZOLAM HYDROCHLORIDE 2 MG: 1 INJECTION, SOLUTION INTRAMUSCULAR; INTRAVENOUS at 11:28

## 2025-02-07 RX ADMIN — Medication 200 MCG: at 14:24

## 2025-02-07 RX ADMIN — Medication 100 MCG: at 14:03

## 2025-02-07 RX ADMIN — ONDANSETRON 4 MG: 2 INJECTION INTRAMUSCULAR; INTRAVENOUS at 14:25

## 2025-02-07 RX ADMIN — IPRATROPIUM BROMIDE AND ALBUTEROL SULFATE 1 DOSE: .5; 2.5 SOLUTION RESPIRATORY (INHALATION) at 11:05

## 2025-02-07 RX ADMIN — SUGAMMADEX 200 MG: 100 INJECTION, SOLUTION INTRAVENOUS at 14:55

## 2025-02-07 RX ADMIN — DEXAMETHASONE SODIUM PHOSPHATE 5 MG: 10 INJECTION INTRAMUSCULAR; INTRAVENOUS at 12:50

## 2025-02-07 RX ADMIN — Medication 2 G: at 12:50

## 2025-02-07 RX ADMIN — Medication 100 MCG: at 13:58

## 2025-02-07 RX ADMIN — PROPOFOL 200 MG: 10 INJECTION, EMULSION INTRAVENOUS at 12:26

## 2025-02-07 ASSESSMENT — PAIN - FUNCTIONAL ASSESSMENT
PAIN_FUNCTIONAL_ASSESSMENT: NONE - DENIES PAIN
PAIN_FUNCTIONAL_ASSESSMENT: 0-10

## 2025-02-07 ASSESSMENT — LIFESTYLE VARIABLES: SMOKING_STATUS: 1

## 2025-02-07 NOTE — H&P
History and Physical    Pt Name: Modesta Tom  MRN: [id number]  YOB: 1955  Date of evaluation: 2/7/2025  Primary Care Physician: No primary care provider on file.    SUBJECTIVE:   History of Chief Complaint:    Modesta Tom is a 69 y.o. female who is scheduled today for PROXIMAL HUMERUS OPEN REDUCTION INTERNAL FIXATION (SYNTHES PH PLATES, PRE-OP BLOCK, FLAT RAMON TABLE, PLEXIGLASS, SUPINE, C-ARM, FIBULAR STRUT GRAFT) - Right. Patient reports on 1/24/2025, she slipped on ice, causing a fall and injury to her right shoulder. Patient denies any other injuries, no LOC. She states she continues to have pain to her right shoulder, no numbness or tingling. Patient is left hand dominant. On imaging study, she was found to have a fracture of her right proximal humerus.   Allergies  has No Known Allergies.  Medications  Prior to Admission medications    Medication Sig Start Date End Date Taking? Authorizing Provider   ibuprofen (ADVIL;MOTRIN) 200 MG tablet Take 4 tablets by mouth 3 times daily Dr. Marin 's office notified patient on this medication 3 times per day   Yes Meaghan Kendall MD   oxyCODONE-acetaminophen (PERCOCET) 5-325 MG per tablet Take 1 tablet by mouth nightly as needed for Pain. Only has a few left   Yes Meaghan Kendall MD   folic acid (FOLVITE) 1 MG tablet Take 1 tablet by mouth daily 12/10/24  Yes Osman Lovell DO   DULoxetine (CYMBALTA) 60 MG extended release capsule Take 1 capsule by mouth daily 9/26/24  Yes Maxime Hinojosa MD   Ergocalciferol (VITAMIN D) 32916 units CAPS Take 50,000 Units by mouth once a week  Patient taking differently: Take 50,000 Units by mouth once a week Sundays 12/15/24   Osman Lovell DO     Past Medical History    has a past medical history of Alcohol abuse, Anemia, Anxiety and depression, COPD (chronic obstructive pulmonary disease) (HCC), Fall on ice, History of recent hospitalization, Humerus fracture, Lives alone, Neuropathy, Under care of team,  Intact and equal bilaterally.  No edema or thickening, without lumps, lesions, or discharge. Hearing grossly WNL.    NOSE:  Nares patent.  No rhinorrhea.   MOUTH/THROAT:  Mucous membranes pink and moist, uvula midline, teeth appear to be intact.   NECK: Supple, no lymphadenopathy.  LUNGS: Respirations even and non-labored. Clear to auscultation bilaterally, no wheezes, rales, or rhonchi.    CARDIOVASCULAR: Regular rate and rhythm, no murmurs/rubs/gallops.   ABDOMEN: soft, non-tender and non-distended, bowel sounds active x 4.   EXTREMITIES: No edema to bilateral lower extremities.  No varicosities to bilateral lower extremities. Right shoulder tenderness, right upper arm with ecchymosis.   NEUROLOGIC: CN II-XII are grossly intact. Gait not assessed.   IMPRESSIONS:   Fracture of proximal humerus.  PLANS:   PROXIMAL HUMERUS OPEN REDUCTION INTERNAL FIXATION (SYNTHES PH PLATES, PRE-OP BLOCK, FLAT RAMON TABLE, PLEXIGLASS, SUPINE, C-ARM, FIBULAR STRUT GRAFT) - Right.    KLAUDIA Jean - CNP   Electronically signed 2/7/2025 at 11:11 AM

## 2025-02-07 NOTE — DISCHARGE INSTRUCTIONS
Orthopaedic Instructions:  -Weight bearing status: Non weight bearing with the right arm with sling on at all times, no range of motion of the shoulder. Okay to come out of sling for range of motion of the elbow only.  -Do not remove dressings until your post-operative follow up visit.  -Always look for signs of compartment syndrome: pain out of proportion to the injury, pain not controlled with pain medication, numbness in digits, changing of color of digits (paleness). If these signs occur return to ED immediately for reassessment.  -Always work on finger motion (to non-injured fingers) while in splint to decrease swelling.  -Ice (20 minutes on and off 1 hour) to reduce swelling and throbbing pain.  -Should urinate within 8 hours of surgery.  -Call the office or come to Emergency Room if signs of infection appear (hot, swollen, red, draining pus, fever).  -Take medications as prescribed.  -Wean off narcotics (percocet/norco) as soon as possible. Do not take tylenol if still taking narcotics.  -Follow up with [facility] in his office  on 2/19/25 at 9:00 AM . Call [phone]   to schedule/confirm or with any questions/concerns.     No alcoholic beverages, no driving or operating machinery, no making important decisions for 24 hours.   You may have a normal diet but should eat lightly day of surgery.  Drink plenty of fluids.  Urinate within 8 hours after surgery, if unable to urinate call your doctor

## 2025-02-07 NOTE — ANESTHESIA PRE PROCEDURE
• Folic acid deficiency E53.8   • Weakness of both legs R29.898   • Smoking F17.200   • Moderate malnutrition (HCC) E44.0   • Fracture of proximal humerus S42.209A       Past Medical History:        Diagnosis Date   • Alcohol abuse    • Anemia    • Anxiety and depression     see's Neurology for this   • COPD (chronic obstructive pulmonary disease) (HCC)     in old PCP notes   • Fall on ice 01/24/2025   • History of recent hospitalization 12/06/2024    til 12/13/2024 : lower ext weakness , alcohol abuse , hypokalemia : Discharegd to rehab but now at home   • Humerus fracture 01/24/2025    right : went to ER on 1/29/2025   • Lives alone    • Neuropathy     with weakness in lower exts : see's Neurology for this   • Under care of team     Ortho, Dr. Marin , last seen 2/5/2025   • Under care of team     Neurology , [facility] , last seen 9/26/2024   • Wears glasses    • Wellness examination     NO PCP       Past Surgical History:        Procedure Laterality Date   • TONSILLECTOMY  1960   • TUBAL LIGATION  1974       Social History:    Social History     Tobacco Use   • Smoking status: Every Day     Current packs/day: 1.50     Average packs/day: 1.5 packs/day for 49.1 years (73.6 ttl pk-yrs)     Types: Cigarettes     Start date: 1/13/1976   • Smokeless tobacco: Never   Substance Use Topics   • Alcohol use: Not Currently     Comment: none x 1 month ,  used to abuse alcohol                                Ready to quit: Not Answered  Counseling given: Not Answered      Vital Signs (Current):   Vitals:    02/06/25 1021 02/07/25 1021   BP:  107/75   Pulse:  78   Resp:  14   Temp:  99 °F (37.2 °C)   TempSrc:  Temporal   SpO2:  99%   Weight: 54.4 kg (120 lb)    Height: 1.702 m (5' 7\")                                               BP Readings from Last 3 Encounters:   02/07/25 107/75   01/29/25 122/87   12/13/24 98/64       NPO Status: Time of last liquid consumption: 2300                        Time of last solid consumption:

## 2025-02-07 NOTE — BRIEF OP NOTE
Brief Postoperative Note      Patient: Modesta Tom  YOB: 1955  MRN: [id number]    Date of Procedure: 2/7/2025    Pre-Op Diagnosis Codes:   -Right proximal humerus fracture    Post-Op Diagnosis:   -Right proximal humerus fracture       Procedure(s):  -Open reduction internal fixation right proximal humerus    Surgeon(s):  Haseeb Marin DO    Assistant:  Resident: Kristie Walton DO; Denys Acosta DO    Anesthesia: General    Estimated Blood Loss (mL): 275 mL    IV Fluids: 1700 mL crystalloid    Complications: None    Specimens:   * No specimens in log *    Implants:  Implant Name Type Inv. Item Serial No.  Lot No. LRB No. Used Action   ALLOGRAFT BNE 60 MM PRESERVON FIBULAR SHFT MATRIGRAFT - [id number]  ALLOGRAFT BNE 60 MM PRESERVON FIBULAR SHFT MATRIGRAFT [id number] [facility] TISSUE [facility] [id number] Right 1 Implanted   SCREW BNE L34MM DIA3.5MM RANDELL S STL ST GURPREET FULL THRD - [id number]  SCREW BNE L34MM DIA3.5MM RANDELL S STL ST GURPREET FULL THRD  DEPU[employer] [place]  Right 1 Implanted   SCREW BNE L36MM DIA3.5MM RANDELL S STL ST GURPREET FULL THRD - [id number]  SCREW BNE L36MM DIA3.5MM RANDELL S STL ST GURPREET FULL THRD  DEPUY SYNTHES [place]-WD  Right 1 Implanted   SCREW BNE L40MM DIA3.5MM RANDELL S STL ST GURPREET FULL THRD - [id number]  SCREW BNE L40MM DIA3.5MM RANDELL S STL ST GURPREET FULL THRD  DEPUY SYNTHES [place]  Right 2 Implanted   SCREW BNE L50MM DIA3.5MM RANDELL S STL ST GURPREET FULL THRD - [id number]  SCREW BNE L50MM DIA3.5MM RANDELL S STL ST GURPREET FULL THRD  DEPUY SYNTHES [place]  Right 1 Implanted   SCREW BNE L44MM DIA3.5MM S STL ST FULL THRD T15 STARDRV - [id number]  SCREW BNE L44MM DIA3.5MM S STL ST FULL THRD T15 STARDRV  DEPU[employer] SYNTHES [place]  Right 3 Implanted   SCREW BNE L22MM DIA3.5MM RANDELL S STL ST NONCANNULATED GURPREET - [id number]  SCREW BNE L22MM DIA3.5MM RANDELL S STL ST NONCANNULATED GURPREET  [facility] [place]  Right 2 Implanted   SCREW BNE L26MM DIA3.5MM RANDELL S STL ST NONCANNULATED GURPREET -

## 2025-02-07 NOTE — ANESTHESIA PROCEDURE NOTES
Peripheral Block    Patient location during procedure: pre-op  Reason for block: post-op pain management and at surgeon's request  Start time: 2/7/2025 11:25 AM  End time: 2/7/2025 11:28 AM  Staffing  Performed: anesthesiologist   Anesthesiologist: Yfn Rodriguez MD  Performed by: Yfn Rodriguez MD  Authorized by: Yfn Rodriguez MD    Preanesthetic Checklist  Completed: patient identified, IV checked, site marked, risks and benefits discussed, surgical/procedural consents, equipment checked, pre-op evaluation, timeout performed, anesthesia consent given, oxygen available, monitors applied/VS acknowledged, fire risk safety assessment completed and verbalized and blood product R/B/A discussed and consented  Peripheral Block   Patient position: supine  Prep: ChloraPrep  Provider prep: mask and sterile gloves  Patient monitoring: continuous pulse ox, frequent blood pressure checks and IV access  Block type: Brachial plexus  Interscalene  Laterality: right  Injection technique: single-shot  Guidance: ultrasound guided    Needle   Needle type: insulated echogenic nerve stimulator needle   Needle gauge: 20 G  Needle localization: ultrasound guidance  Needle length: 10 cm  Assessment   Injection assessment: negative aspiration for heme, no paresthesia on injection, local visualized surrounding nerve on ultrasound and no intravascular symptoms  Paresthesia pain: none  Slow fractionated injection: yes  Hemodynamics: stable  Outcomes: uncomplicated and patient tolerated procedure well    Medications Administered  BUPivacaine (MARCAINE) PF injection 0.5% - Perineural   10 mL - 2/7/2025 11:25:00 AM  BUPivacaine liposome (EXPAREL) injection 1.3% - Perineural   10 mL - 2/7/2025 11:25:00 AM

## 2025-02-08 LAB
EKG ATRIAL RATE: 77 BPM
EKG P AXIS: 36 DEGREES
EKG P-R INTERVAL: 132 MS
EKG Q-T INTERVAL: 398 MS
EKG QRS DURATION: 62 MS
EKG QTC CALCULATION (BAZETT): 450 MS
EKG R AXIS: 21 DEGREES
EKG T AXIS: 18 DEGREES
EKG VENTRICULAR RATE: 77 BPM

## 2025-02-08 PROCEDURE — 93010 ELECTROCARDIOGRAM REPORT: CPT | Performed by: INTERNAL MEDICINE

## 2025-02-08 NOTE — OP NOTE
Operative Note      Patient: Modesta Tom  YOB: 1955  MRN: [id number]    Date of Procedure: 2/7/2025    Pre-Op Diagnosis Codes:   Right proximal humerus fracture    Post-Op Diagnosis: Right proximal humerus fracture       Procedure(s):  PROXIMAL HUMERUS OPEN REDUCTION INTERNAL FIXATION-right    Surgeon(s):  Haseeb Marin DO    Assistant:   Resident: Kristie Walton DO; Denys Acosta DO    Anesthesia: General    Estimated Blood Loss (mL): 275 cc    IV fluid: 1700 cc crystalloid    Complications: None    Specimens:   * No specimens in log *    Implants:  Implant Name Type Inv. Item Serial No.  Lot No. LRB No. Used Action   ALLOGRAFT BNE 60 MM PRESERVON FIBULAR SHFT MATRIGRAFT - [id number]  ALLOGRAFT BNE 60 MM PRESERVON FIBULAR SHFT MATRIGRAFT [id number] [facility] TISSUE BANK- [id number] Right 1 Implanted   SCREW BNE L34MM DIA3.5MM RANDELL S STL ST GURPREET FULL THRD - [id number]  SCREW BNE L34MM DIA3.5MM RANDELL S STL ST GURPREET FULL THRD  DEPU[employer] [place]  Right 1 Implanted   SCREW BNE L36MM DIA3.5MM RANDELL S STL ST GURPREET FULL THRD - [id number]  SCREW BNE L36MM DIA3.5MM RANDELL S STL ST GURPREET FULL THRD  DEPU[employer] [place]  Right 1 Implanted   SCREW BNE L40MM DIA3.5MM RANDELL S STL ST GURPREET FULL THRD - [id number]  SCREW BNE L40MM DIA3.5MM RANDELL S STL ST GURPREET FULL THRD  DEPU[employer] [place]  Right 2 Implanted   SCREW BNE L50MM DIA3.5MM RANDELL S STL ST GURPREET FULL THRD - [id number]  SCREW BNE L50MM DIA3.5MM RANDELL S STL ST GURPREET FULL THRD  DEPU[employer] [place]  Right 1 Implanted   SCREW BNE L44MM DIA3.5MM S STL ST FULL THRD T15 STARDRV - [id number]  SCREW BNE L44MM DIA3.5MM S STL ST FULL THRD T15 STARDRV  DEPU[employer] [place]  Right 3 Implanted   SCREW BNE L22MM DIA3.5MM RANDELL S STL ST NONCANNULATED GURPREET - [id number]  SCREW BNE L22MM DIA3.5MM RANDELL S STL ST NONCANNULATED GURPREET  [facility] [place]-WD  Right 2 Implanted   SCREW BNE L26MM DIA3.5MM RANDELL S STL ST NONCANNULATED GURPREET - [id number]

## 2025-02-08 NOTE — ANESTHESIA POSTPROCEDURE EVALUATION
Department of Anesthesiology  Postprocedure Note    Patient: Modesta Tom  MRN: [id number]  YOB: 1955  Date of evaluation: 2/7/2025    Procedure Summary       Date: 02/07/25 Room / Location: [address]    Anesthesia Start: 1222 Anesthesia Stop: 1508    Procedure: PROXIMAL HUMERUS OPEN REDUCTION INTERNAL FIXATION (Right: Arm Upper) Diagnosis:       Fracture of proximal humerus      (Fracture of proximal humerus [S42.209A])    Surgeons: Haseeb Marin DO Responsible Provider: Yfn Rodriguez MD    Anesthesia Type: general ASA Status: 2            Anesthesia Type: No value filed.    Adalberto Phase I: Adalberto Score: 10    Adalberto Phase II: Adalberto Score: 10  POST-OP ANESTHESIA NOTE       /69   Pulse 78   Temp 96.8 °F (36 °C)   Resp 30   Ht 1.702 m (5' 7\")   Wt 54.4 kg (120 lb)   SpO2 92%   BMI 18.79 kg/m²    Pain Assessment: Face, Legs, Activity, Cry, and Consolability (FLACC) (sleeping)  Pain Level: 8         Anesthesia Post Evaluation    Patient location during evaluation: PACU  Patient participation: complete - patient participated  Level of consciousness: awake  Pain score: 8  Airway patency: patent  Nausea & Vomiting: no vomiting and no nausea  Cardiovascular status: hemodynamically stable  Respiratory status: acceptable  Hydration status: stable  Pain management: adequate        No notable events documented.

## 2025-02-19 ENCOUNTER — OFFICE VISIT (OUTPATIENT)
Dept: ORTHOPEDIC SURGERY | Age: 70
End: 2025-02-19

## 2025-02-19 VITALS — HEIGHT: 67 IN | BODY MASS INDEX: 18.83 KG/M2 | WEIGHT: 120 LBS

## 2025-02-19 DIAGNOSIS — M25.511 RIGHT SHOULDER PAIN, UNSPECIFIED CHRONICITY: Primary | ICD-10-CM

## 2025-02-19 PROCEDURE — 99024 POSTOP FOLLOW-UP VISIT: CPT | Performed by: STUDENT IN AN ORGANIZED HEALTH CARE EDUCATION/TRAINING PROGRAM

## 2025-02-19 RX ORDER — OXYCODONE AND ACETAMINOPHEN 5; 325 MG/1; MG/1
1 TABLET ORAL EVERY 6 HOURS PRN
Qty: 28 TABLET | Refills: 0 | Status: SHIPPED | OUTPATIENT
Start: 2025-02-19 | End: 2025-02-26

## 2025-02-19 NOTE — PROGRESS NOTES
[facility]  [address]  [place] [address]  Dept: [phone]  Dept Fax: [phone]        Ambulatory Follow Up    Subjective:       HPI:    Modesta Tom is a 69 y.o. year old female who presents to our office today for routine follow-up regarding her right proximal humerus ORIF.  She is currently 2 weeks out from surgery.  She is seen today with her daughter at bedside.  Overall patient is doing extremely well.  She currently takes Tylenol to control her pain.  She states the Roxicodone has provided minimal relief.  She denies any numbness or tingling in the extremity.  She denies any erythema or drainage from the incision site.  She does note difficulty sleeping specifically with rolling over onto the prior surgical site.  She has tried sleeping in her recliner as well as stacking pillows to prevent rollover.  At today's visit she denies fever, chills, chest pain, shortness of breath.       Review of Systems:  Constitutional: Negative for fever and chills.   HENT: Negative for congestion.    Eyes: Negative for blurred vision and double vision.   Respiratory: Negative for cough, shortness of breath and wheezing.    Cardiovascular: Negative for chest pain and palpitations.   Gastrointestinal: Negative for nausea. Negative for vomiting.   Musculoskeletal: Positive for (right shoulder pain).   Skin: Negative for itching and rash.   Neurological: Negative for dizziness, sensory change and headaches.   Psychiatric/Behavioral: Negative for depression and suicidal ideas.       Objective :   General: AAOx3, NAD, appears stated age  CV: no obvious JVD, distal pulses 2+  Respiratory: chest rise symmetric, unlabored respirations, no audible wheezing  Skin: warm, well perfused, no obvious rashes or lesions  Psych: Patient displays understanding of exam, diagnosis, and plan.    MSK:     RUE: Optifoam taken down.  Incision site

## 2025-03-19 ENCOUNTER — OFFICE VISIT (OUTPATIENT)
Dept: ORTHOPEDIC SURGERY | Age: 70
End: 2025-03-19

## 2025-03-19 DIAGNOSIS — M25.511 RIGHT SHOULDER PAIN, UNSPECIFIED CHRONICITY: Primary | ICD-10-CM

## 2025-03-19 PROCEDURE — 99024 POSTOP FOLLOW-UP VISIT: CPT | Performed by: STUDENT IN AN ORGANIZED HEALTH CARE EDUCATION/TRAINING PROGRAM

## 2025-03-19 NOTE — PROGRESS NOTES
[facility]  [address]  [place] [address]  Dept: [phone]  Dept Fax: [phone]        Orthopaedic Clinic Follow Up      Subjective:   Date of Surgery: 2/7/24 -- Right proximal humerus ORIF w fibular strut (Dr. Haseeb Marin)    Modesta Tom is a 69 y.o. female who presents to the clinic today for routine follow up of right proximal humerus fracture with fibular strut allograft performed with Dr. Marin on the above date.  She is approximately 6 weeks out from injury today.  She still reports some pain with shoulder movement.  She has not regained full motion of her shoulder, but she has been going to physical therapy 3 times a week.  She is taking her vitamin D once weekly.  She has depleted her oxycodone supply.  She has been taking Tylenol and Advil regularly.  She has no new falls or injuries.  She has no numbness or tingling.    Review of Systems  Gen: no fever, chills, malaise  CV: no chest pain or palpitations  Resp: no cough or shortness of breath  GI: no nausea, vomiting, diarrhea, or constipation  Neuro: no seizures, vertigo, or headache  Msk: See HPI  10 remaining systems reviewed and negative    Objective :   There were no vitals filed for this visit.There is no height or weight on file to calculate BMI.  General: No acute distress, resting comfortably in the clinic  Neuro: alert. oriented  Eyes: Extra-ocular muscles intact  Pulm: Respirations unlabored and regular.  Skin: warm, well perfused  Psych:   Patient has good fund of knowledge and displays understanding of exam, diagnosis, and plan.  MSK:  Right upper extremity: Patient is tender to palpation at the fracture site.  She has abduction to approximately 100 degrees and abduction to 90 degrees.  She has weakness with deltoid strength testing and subscapularis strength testing.  The remainder of her extremity is warm and well-perfused without any sensory

## 2025-03-27 ENCOUNTER — OFFICE VISIT (OUTPATIENT)
Dept: NEUROLOGY | Age: 70
End: 2025-03-27
Payer: MEDICARE

## 2025-03-27 VITALS
BODY MASS INDEX: 17.61 KG/M2 | HEART RATE: 101 BPM | HEIGHT: 67 IN | DIASTOLIC BLOOD PRESSURE: 90 MMHG | SYSTOLIC BLOOD PRESSURE: 132 MMHG | WEIGHT: 112.2 LBS

## 2025-03-27 DIAGNOSIS — F10.11 HISTORY OF ETOH ABUSE: ICD-10-CM

## 2025-03-27 DIAGNOSIS — G62.9 NEUROPATHY: ICD-10-CM

## 2025-03-27 DIAGNOSIS — M79.2 NEUROPATHIC PAIN: Primary | ICD-10-CM

## 2025-03-27 DIAGNOSIS — F32.A ANXIETY AND DEPRESSION: ICD-10-CM

## 2025-03-27 DIAGNOSIS — F41.9 ANXIETY AND DEPRESSION: ICD-10-CM

## 2025-03-27 PROCEDURE — G8427 DOCREV CUR MEDS BY ELIG CLIN: HCPCS

## 2025-03-27 PROCEDURE — 1123F ACP DISCUSS/DSCN MKR DOCD: CPT

## 2025-03-27 PROCEDURE — 1090F PRES/ABSN URINE INCON ASSESS: CPT

## 2025-03-27 PROCEDURE — 3017F COLORECTAL CA SCREEN DOC REV: CPT

## 2025-03-27 PROCEDURE — 99214 OFFICE O/P EST MOD 30 MIN: CPT

## 2025-03-27 PROCEDURE — G8400 PT W/DXA NO RESULTS DOC: HCPCS

## 2025-03-27 PROCEDURE — G8419 CALC BMI OUT NRM PARAM NOF/U: HCPCS

## 2025-03-27 PROCEDURE — 1160F RVW MEDS BY RX/DR IN RCRD: CPT

## 2025-03-27 PROCEDURE — 1159F MED LIST DOCD IN RCRD: CPT

## 2025-03-27 PROCEDURE — 4004F PT TOBACCO SCREEN RCVD TLK: CPT

## 2025-03-27 RX ORDER — GABAPENTIN 100 MG/1
CAPSULE ORAL
Qty: 120 CAPSULE | Refills: 1 | Status: SHIPPED | OUTPATIENT
Start: 2025-03-27 | End: 2025-09-23

## 2025-03-27 ASSESSMENT — ENCOUNTER SYMPTOMS
ABDOMINAL DISTENTION: 0
COUGH: 0
CHEST TIGHTNESS: 0
VOMITING: 0
SINUS PAIN: 0
ABDOMINAL PAIN: 0
SHORTNESS OF BREATH: 0
EYE PAIN: 0
APNEA: 0
NAUSEA: 0

## 2025-03-27 ASSESSMENT — VISUAL ACUITY: VA_NORMAL: 1

## 2025-03-27 NOTE — TELEPHONE ENCOUNTER
Pt is requesting refill of:     Medication: Cymbalta 60 mg    Active on Med List: Yes     Last Office Visit: 9/26/24    Next Office Visit: today 3/27/25    Please refill is request is acceptable. Thank you

## 2025-03-27 NOTE — PROGRESS NOTES
[address], [address]  [place] [address]  P: [phone]  F: [phone]    NEUROLOGY CLINIC NOTE     PATIENT NAME: Modesta Tom  PATIENT MRN: [id number]  PRIMARY CARE PHYSICIAN: No primary care provider on file.    Interval clinic follow up 9/26/24   Patient is a 69 year old female who presents to clinic as a follow up for bilateral lower extremity length dependent neuropathy. Patient had stopped her gabapentin when she started on cymbalta. She is tolerating cymbalta 60 mg daily without side effects. Notes that cymbalta has some effect on her neuropathic pain but does not work as well as the gabapentin. Symptoms are worse at night and keep her up. Average pain is 5/10.    Interval clinic follow up 9/26/24   Patient is a 68 year old female who presents to clinic as a follow up for bilateral lower extremity length dependent neuropathy. Patient states that her neuropathy is intermittently worse. On days it gets as bad as 7/10 but usually around 3/10. She also has been noticing it more when walking lately. No new autonomic symptoms. She states that her depression is worsening and requests an increased dose of lexapro.    Interval clinic follow up 9/15/22      Modesta Tom is a 69 y.o. female who presents to clinic today as follow up for neuropathy of bilateral feet. Patient states that she has good and bad days with her neuropathy. Does feel like it has been climbing up her shins more over time. Notices it significantly when ambulating. Also notes that her depression has been worsening and requests an increase dose of lexapro.       HPI 1/27/22    Modesta Tom is a 66 y.o. female who presents to clinic today as a new patient for me but has been following Dr. Gris epstein since 2/20/2020 for neuropathy. PMH significant for anxiety/depression and neuropathy of the feet. Initally in 2016 patient fell down hurting her ankle found to have left malleolar

## 2025-03-31 RX ORDER — DULOXETIN HYDROCHLORIDE 60 MG/1
60 CAPSULE, DELAYED RELEASE ORAL DAILY
Qty: 90 CAPSULE | Refills: 1 | Status: SHIPPED | OUTPATIENT
Start: 2025-03-31

## 2025-04-16 ENCOUNTER — OFFICE VISIT (OUTPATIENT)
Dept: ORTHOPEDIC SURGERY | Age: 70
End: 2025-04-16

## 2025-04-16 VITALS — WEIGHT: 112 LBS | BODY MASS INDEX: 17.58 KG/M2 | HEIGHT: 67 IN

## 2025-04-16 DIAGNOSIS — M25.511 RIGHT SHOULDER PAIN, UNSPECIFIED CHRONICITY: Primary | ICD-10-CM

## 2025-04-16 PROCEDURE — 99024 POSTOP FOLLOW-UP VISIT: CPT | Performed by: STUDENT IN AN ORGANIZED HEALTH CARE EDUCATION/TRAINING PROGRAM

## 2025-04-16 NOTE — PROGRESS NOTES
[facility]  [address]  [place] [address]  Dept: [phone]  Dept Fax: [phone]        Ambulatory Follow Up    Subjective:       HPI:    Modesta Tom is a 69 y.o. year old female who presents to our office today for routine follow-up regarding right shoulder.  Patient is currently 2 months out from a right proximal humerus open reduction internal fixation performed with Dr. Marin.  Overall patient is doing well.  Her pain is well-controlled and she denies any new numbness or tingling in the extremity.  Currently she intermittently takes Advil.  She currently in physical therapy and believes she is getting better in regards of both strength and range of motion.  She currently does have right shoulder stiffness, however she understands that this is common with this procedure.  She is performing at home exercises focusing on stretching.  At today's visit she denies fever, chills, chest pain, shortness of breath or new numbness or tingling.         Review of Systems:  Constitutional: Negative for fever and chills.   HENT: Negative for congestion.    Eyes: Negative for blurred vision and double vision.   Respiratory: Negative for cough, shortness of breath and wheezing.    Cardiovascular: Negative for chest pain and palpitations.   Gastrointestinal: Negative for nausea. Negative for vomiting.   Musculoskeletal: Positive for (right shoulder stiffness).   Skin: Negative for itching and rash.   Neurological: Negative for dizziness, sensory change and headaches.   Psychiatric/Behavioral: Negative for depression and suicidal ideas.       Objective :   General: AAOx3, NAD, appears stated age  CV: no obvious JVD, distal pulses 2+  Respiratory: chest rise symmetric, unlabored respirations, no audible wheezing  Skin: warm, well perfused, no obvious rashes or lesions  Psych: Patient displays understanding of exam, diagnosis, and

## 2025-05-28 DIAGNOSIS — G62.9 NEUROPATHY: ICD-10-CM

## 2025-05-29 RX ORDER — GABAPENTIN 100 MG/1
CAPSULE ORAL
Qty: 120 CAPSULE | Refills: 1 | Status: SHIPPED | OUTPATIENT
Start: 2025-05-29 | End: 2025-08-27

## 2025-06-11 ENCOUNTER — OFFICE VISIT (OUTPATIENT)
Dept: ORTHOPEDIC SURGERY | Age: 70
End: 2025-06-11
Payer: MEDICARE

## 2025-06-11 VITALS — BODY MASS INDEX: 17.54 KG/M2 | HEIGHT: 67 IN

## 2025-06-11 DIAGNOSIS — M25.511 RIGHT SHOULDER PAIN, UNSPECIFIED CHRONICITY: Primary | ICD-10-CM

## 2025-06-11 DIAGNOSIS — S42.291G OTHER CLOSED DISPLACED FRACTURE OF PROXIMAL END OF RIGHT HUMERUS WITH DELAYED HEALING, SUBSEQUENT ENCOUNTER: ICD-10-CM

## 2025-06-11 PROCEDURE — 3017F COLORECTAL CA SCREEN DOC REV: CPT | Performed by: STUDENT IN AN ORGANIZED HEALTH CARE EDUCATION/TRAINING PROGRAM

## 2025-06-11 PROCEDURE — 1159F MED LIST DOCD IN RCRD: CPT | Performed by: STUDENT IN AN ORGANIZED HEALTH CARE EDUCATION/TRAINING PROGRAM

## 2025-06-11 PROCEDURE — 1090F PRES/ABSN URINE INCON ASSESS: CPT | Performed by: STUDENT IN AN ORGANIZED HEALTH CARE EDUCATION/TRAINING PROGRAM

## 2025-06-11 PROCEDURE — 99213 OFFICE O/P EST LOW 20 MIN: CPT | Performed by: STUDENT IN AN ORGANIZED HEALTH CARE EDUCATION/TRAINING PROGRAM

## 2025-06-11 PROCEDURE — 1123F ACP DISCUSS/DSCN MKR DOCD: CPT | Performed by: STUDENT IN AN ORGANIZED HEALTH CARE EDUCATION/TRAINING PROGRAM

## 2025-06-11 PROCEDURE — 1126F AMNT PAIN NOTED NONE PRSNT: CPT | Performed by: STUDENT IN AN ORGANIZED HEALTH CARE EDUCATION/TRAINING PROGRAM

## 2025-06-11 PROCEDURE — G8427 DOCREV CUR MEDS BY ELIG CLIN: HCPCS | Performed by: STUDENT IN AN ORGANIZED HEALTH CARE EDUCATION/TRAINING PROGRAM

## 2025-06-11 PROCEDURE — G8419 CALC BMI OUT NRM PARAM NOF/U: HCPCS | Performed by: STUDENT IN AN ORGANIZED HEALTH CARE EDUCATION/TRAINING PROGRAM

## 2025-06-11 PROCEDURE — G8400 PT W/DXA NO RESULTS DOC: HCPCS | Performed by: STUDENT IN AN ORGANIZED HEALTH CARE EDUCATION/TRAINING PROGRAM

## 2025-06-11 PROCEDURE — 4004F PT TOBACCO SCREEN RCVD TLK: CPT | Performed by: STUDENT IN AN ORGANIZED HEALTH CARE EDUCATION/TRAINING PROGRAM

## 2025-06-11 NOTE — PROGRESS NOTES
types were placed in this encounter.           Orders Placed This Encounter   Procedures    XR SHOULDER RIGHT (MIN 2 VIEWS)     Standing Status:   Future     Number of Occurrences:   1     Expected Date:   6/11/2025     Expiration Date:   6/9/2026       Denys Acosta DO  Orthopedic Surgery, PGY-1  [place], [place]

## 2025-07-15 NOTE — PROGRESS NOTES
macrocytosis, likely related to alcohol history. EMG/NCS has been repeatedly declined.  Plan:  Patient been not taking gabapentin since January 2025, will discontinue at  Increase duloxetine from 60 mg daily to 60 mg twice daily for both neuropathic pain and mood  strict alcohol abstinence  fall precautions and recommend supportive footwear  Consider alpha-lipoic acid or topical lidocaine for adjunct pain control if symptoms worsen    2. Depression and Anxiety:  Patient has a long-standing history of depression and anxiety, currently on duloxetine with stable psychiatric symptoms. Previously on Lexapro; patient had requested a dose increase when symptoms worsened.  Plan:  Continue duloxetine 60 mg twice daily    3. History of Alcohol Use Disorder (in remission):  History of heavy alcohol use (~1/5th vodka daily) with successful inpatient rehabilitation in 2009. No reported use since, and she remains committed to sobriety.  Plan:  No current signs of relapse or hepatic dysfunction  Will consider Repeat LFTs and vitamin panel in next 6-12 months as routine monitoring    4. Vitamin and Metabolic Considerations in Neuropathy:  Prior labs showed elevated copper (though improving), macrocytosis, and normal ceruloplasmin. Most recent labs also showed anemia and high MCV.  Plan:  Continue folic acid supplementation  Monitor CBC, B12, folate, copper, ceruloplasmin, and zinc annually      Follow-Up:  Return to neurology clinic in 6 months  Sooner follow-up if new weakness, worsening imbalance, or functional decline    Instructed patient to call the clinic if symptoms worsen or develop any new symptoms.         Ms. Tom received counseling on the following healthy behaviors: medical compliance, smoking cessation, blood pressure control, regular follow up with primary doctor.      I have spent 60 minutes face to face with the patient more than 50% of this time was spent counseling and coordinating care.      Electronically

## 2025-07-16 ENCOUNTER — OFFICE VISIT (OUTPATIENT)
Dept: NEUROLOGY | Age: 70
End: 2025-07-16
Payer: MEDICARE

## 2025-07-16 VITALS
BODY MASS INDEX: 18.21 KG/M2 | SYSTOLIC BLOOD PRESSURE: 132 MMHG | DIASTOLIC BLOOD PRESSURE: 80 MMHG | HEIGHT: 67 IN | HEART RATE: 111 BPM | WEIGHT: 116 LBS

## 2025-07-16 DIAGNOSIS — F32.A ANXIETY AND DEPRESSION: ICD-10-CM

## 2025-07-16 DIAGNOSIS — F41.9 ANXIETY AND DEPRESSION: ICD-10-CM

## 2025-07-16 DIAGNOSIS — G62.9 NEUROPATHY: ICD-10-CM

## 2025-07-16 DIAGNOSIS — G62.9 POLYNEUROPATHY: Primary | ICD-10-CM

## 2025-07-16 PROCEDURE — G8427 DOCREV CUR MEDS BY ELIG CLIN: HCPCS

## 2025-07-16 PROCEDURE — 4004F PT TOBACCO SCREEN RCVD TLK: CPT

## 2025-07-16 PROCEDURE — 3017F COLORECTAL CA SCREEN DOC REV: CPT

## 2025-07-16 PROCEDURE — G8400 PT W/DXA NO RESULTS DOC: HCPCS

## 2025-07-16 PROCEDURE — 1123F ACP DISCUSS/DSCN MKR DOCD: CPT

## 2025-07-16 PROCEDURE — 99214 OFFICE O/P EST MOD 30 MIN: CPT

## 2025-07-16 PROCEDURE — 1090F PRES/ABSN URINE INCON ASSESS: CPT

## 2025-07-16 PROCEDURE — 1159F MED LIST DOCD IN RCRD: CPT

## 2025-07-16 PROCEDURE — G8419 CALC BMI OUT NRM PARAM NOF/U: HCPCS

## 2025-07-16 PROCEDURE — 1126F AMNT PAIN NOTED NONE PRSNT: CPT

## 2025-07-16 RX ORDER — DULOXETIN HYDROCHLORIDE 60 MG/1
60 CAPSULE, DELAYED RELEASE ORAL 2 TIMES DAILY
Qty: 90 CAPSULE | Refills: 1 | Status: SHIPPED | OUTPATIENT
Start: 2025-07-16

## 2025-07-16 RX ORDER — DULOXETIN HYDROCHLORIDE 60 MG/1
60 CAPSULE, DELAYED RELEASE ORAL DAILY
Qty: 90 CAPSULE | Refills: 1 | Status: SHIPPED | OUTPATIENT
Start: 2025-07-16 | End: 2025-07-16

## (undated) DEVICE — STRAP,POSITIONING,KNEE/BODY,FOAM,4X60": Brand: MEDLINE

## (undated) DEVICE — APPLICATOR MEDICATED 10.5 CC SOLUTION HI LT ORNG CHLORAPREP

## (undated) DEVICE — STRAP ARMBRD W1.5XL32IN FOAM STR YET SFT W/ HK AND LOOP

## (undated) DEVICE — SUTURE MONOCRYL SZ 2-0 L27IN ABSRB UD SH L26MM TAPERPOINT NDL Y417H

## (undated) DEVICE — SUTURE PDS II SZ 0 L27IN ABSRB VLT L36MM CT-1 1/2 CIR Z340H

## (undated) DEVICE — GOWN,AURORA,NONREINFORCED,LARGE: Brand: MEDLINE

## (undated) DEVICE — 3M™ IOBAN™ 2 ANTIMICROBIAL INCISE DRAPE 6650EZ: Brand: IOBAN™ 2

## (undated) DEVICE — SPONGE LAP W18XL18IN WHT COT 4 PLY FLD STRUNG RADPQ DISP ST 2 PER PACK

## (undated) DEVICE — SUTURE FIBERWIRE SZ 2 L38IN NONABSORBABLE BLU L36.6MM 1/2 AR7202

## (undated) DEVICE — BIT DRL L200MM DIA2.8MM CALIB L100MM FOR 3.5MM VA LCP PROX

## (undated) DEVICE — BIT DRL L110MM DIA2.5MM G QUIK CPL W/O STP REUSE

## (undated) DEVICE — DRESSING FOAM W4XL10IN AG SIL ADH ANTIMIC POSTOP OPTIFOAM

## (undated) DEVICE — Device

## (undated) DEVICE — STOCKINETTE,IMPERVIOUS,12X48,STERILE: Brand: MEDLINE

## (undated) DEVICE — GUIDEWIRE ORTH L150MM DIA1.25MM S STL THRD FOR 4MM CANN SCR

## (undated) DEVICE — GLOVE ORTHO 8   MSG9480

## (undated) DEVICE — CYSTO/BLADDER IRRIGATION SET, REGULATING CLAMP

## (undated) DEVICE — GAUZE,SPONGE,4"X4",16PLY,XRAY,STRL,LF: Brand: MEDLINE

## (undated) DEVICE — TOWEL,OR,DSP,ST,NATURAL,DLX,4/PK,20PK/CS: Brand: MEDLINE

## (undated) DEVICE — SURGICAL SUCTION CONNECTING TUBE WITH MALE CONNECTOR AND SUCTION CLAMP, 2 FT. LONG (.6 M), 5 MM I.D.: Brand: CONMED

## (undated) DEVICE — APPLICATOR MEDICATED 26 CC SOLUTION HI LT ORNG CHLORAPREP

## (undated) DEVICE — DRAPE,U/ SHT,SPLIT,PLAS,STERIL: Brand: MEDLINE

## (undated) DEVICE — 1016 S-DRAPE IRRIG POUCH 10/BOX: Brand: STERI-DRAPE™

## (undated) DEVICE — BANDAGE,GAUZE,BULKEE II,4.5"X4.1YD,STRL: Brand: MEDLINE

## (undated) DEVICE — GLOVE ORANGE PI 8   MSG9080